# Patient Record
Sex: FEMALE | Race: BLACK OR AFRICAN AMERICAN | Employment: OTHER | URBAN - METROPOLITAN AREA
[De-identification: names, ages, dates, MRNs, and addresses within clinical notes are randomized per-mention and may not be internally consistent; named-entity substitution may affect disease eponyms.]

---

## 2017-01-11 ENCOUNTER — OFFICE VISIT (OUTPATIENT)
Dept: ONCOLOGY | Age: 77
End: 2017-01-11

## 2017-01-11 ENCOUNTER — HOSPITAL ENCOUNTER (OUTPATIENT)
Dept: LAB | Age: 77
Discharge: HOME | End: 2017-01-11
Payer: MEDICARE

## 2017-01-11 ENCOUNTER — HOSPITAL ENCOUNTER (OUTPATIENT)
Dept: ONCOLOGY | Age: 77
Discharge: HOME | End: 2017-01-11

## 2017-01-11 VITALS
SYSTOLIC BLOOD PRESSURE: 126 MMHG | DIASTOLIC BLOOD PRESSURE: 80 MMHG | HEART RATE: 75 BPM | HEIGHT: 63 IN | WEIGHT: 123 LBS | TEMPERATURE: 97.9 F | BODY MASS INDEX: 21.79 KG/M2

## 2017-01-11 DIAGNOSIS — N18.3 ANEMIA ASSOCIATED WITH CHRONIC RENAL FAILURE, STAGE 3 (MODERATE): ICD-10-CM

## 2017-01-11 DIAGNOSIS — D72.819 LEUKOPENIA, UNSPECIFIED TYPE: ICD-10-CM

## 2017-01-11 DIAGNOSIS — D46.9 MYELODYSPLASTIC SYNDROME (HCC): ICD-10-CM

## 2017-01-11 DIAGNOSIS — D63.1 ANEMIA ASSOCIATED WITH CHRONIC RENAL FAILURE, STAGE 3 (MODERATE): ICD-10-CM

## 2017-01-11 DIAGNOSIS — D69.6 THROMBOCYTOPENIA (HCC): ICD-10-CM

## 2017-01-11 DIAGNOSIS — D46.9 MYELODYSPLASTIC SYNDROME (HCC): Primary | ICD-10-CM

## 2017-01-11 LAB
ALBUMIN SERPL BCP-MCNC: 3.9 G/DL (ref 3.4–5)
ALBUMIN/GLOB SERPL: 1.1 {RATIO} (ref 0.8–1.7)
ALP SERPL-CCNC: 91 U/L (ref 45–117)
ALT SERPL-CCNC: 11 U/L (ref 13–56)
ANION GAP BLD CALC-SCNC: 10 MMOL/L (ref 3–18)
AST SERPL W P-5'-P-CCNC: 11 U/L (ref 15–37)
BASO+EOS+MONOS # BLD AUTO: 0.2 K/UL (ref 0–2.3)
BASO+EOS+MONOS # BLD AUTO: 4 % (ref 0.1–17)
BILIRUB SERPL-MCNC: 0.3 MG/DL (ref 0.2–1)
BUN SERPL-MCNC: 33 MG/DL (ref 7–18)
BUN/CREAT SERPL: 19 (ref 12–20)
CALCIUM SERPL-MCNC: 9.6 MG/DL (ref 8.5–10.1)
CHLORIDE SERPL-SCNC: 103 MMOL/L (ref 100–108)
CO2 SERPL-SCNC: 25 MMOL/L (ref 21–32)
CREAT SERPL-MCNC: 1.7 MG/DL (ref 0.6–1.3)
DIFFERENTIAL METHOD BLD: ABNORMAL
ERYTHROCYTE [DISTWIDTH] IN BLOOD BY AUTOMATED COUNT: 13.2 % (ref 11.5–14.5)
FERRITIN SERPL-MCNC: 217 NG/ML (ref 8–388)
GLOBULIN SER CALC-MCNC: 3.5 G/DL (ref 2–4)
GLUCOSE SERPL-MCNC: 194 MG/DL (ref 74–99)
HCT VFR BLD AUTO: 30.3 % (ref 36–48)
HGB BLD-MCNC: 9.8 G/DL (ref 12–16)
IRON SATN MFR SERPL: 26 %
IRON SERPL-MCNC: 64 UG/DL (ref 50–175)
LYMPHOCYTES # BLD AUTO: 28 % (ref 14–44)
LYMPHOCYTES # BLD: 1.4 K/UL (ref 1.1–5.9)
MCH RBC QN AUTO: 30.2 PG (ref 25–35)
MCHC RBC AUTO-ENTMCNC: 32.3 G/DL (ref 31–37)
MCV RBC AUTO: 93.5 FL (ref 78–102)
NEUTS SEG # BLD: 3.5 K/UL (ref 1.8–9.5)
NEUTS SEG NFR BLD AUTO: 68 % (ref 40–70)
PLATELET # BLD AUTO: 217 K/UL (ref 140–440)
POTASSIUM SERPL-SCNC: 5 MMOL/L (ref 3.5–5.5)
PROT SERPL-MCNC: 7.4 G/DL (ref 6.4–8.2)
RBC # BLD AUTO: 3.24 M/UL (ref 4.1–5.1)
SODIUM SERPL-SCNC: 138 MMOL/L (ref 136–145)
TIBC SERPL-MCNC: 245 UG/DL (ref 250–450)
WBC # BLD AUTO: 5.1 K/UL (ref 4.5–13)

## 2017-01-11 PROCEDURE — 82728 ASSAY OF FERRITIN: CPT | Performed by: NURSE PRACTITIONER

## 2017-01-11 PROCEDURE — 36415 COLL VENOUS BLD VENIPUNCTURE: CPT | Performed by: NURSE PRACTITIONER

## 2017-01-11 PROCEDURE — 80053 COMPREHEN METABOLIC PANEL: CPT | Performed by: NURSE PRACTITIONER

## 2017-01-11 PROCEDURE — 83540 ASSAY OF IRON: CPT | Performed by: NURSE PRACTITIONER

## 2017-01-11 NOTE — PROGRESS NOTES
Hematology/Oncology  Progress Note    Name: Aquilino Johnston  Date: 2017  : 1940    Clara Tavera MD     Ms. Saud Ly is a 77y.o. year old female who was seen for Anemia secondary to chronic kidney disease, myelodysplastic syndrome, and chronic leukopenia and thrombocytopenia. Current therapy: Procrit,  dose of 60,000 units whenever the hematocrit is below 30% and supportive care. Subjective:     Ms. Saud Ly is a 80-year-old woman who has a long-standing history of chronic kidney disease with associated anemia. She also has myelodysplastic syndrome. The patient has no complaint of weakness or fatigue. She also denies bleeding, blood loss or any bruising. She report poor appetite at times. She states she is doing well and feels fine at present. She is s/p right knee replacement and is currently participation in a physical therapy program.    Past medical history, family history, and social history: these were reviewed and remains unchanged. Past Medical History   Diagnosis Date    Arthritis     Asthma     Breathing problem     Diabetes ([address]     Hypercholesterolemia     Hypertension     Iron deficiency anemia     Pacemaker     Ulcer [facility])      Past Surgical History   Procedure Laterality Date    Endoscopy, colon, diagnostic      Hx orthopaedic       knee replacement    Hx hernia repair      Pr cardiac surg procedure unlist       pacemaker     Social History     Social History    Marital status:      Spouse name: N/A    Number of children: N/A    Years of education: N/A     Occupational History    Not on file.      Social History Main Topics    Smoking status: Never Smoker    Smokeless tobacco: Not on file    Alcohol use No    Drug use: No    Sexual activity: Not on file     Other Topics Concern    Not on file     Social History Narrative     Family History   Problem Relation Age of Onset    Hypertension Other     Heart Disease Neg Hx     Cancer Neg Hx     Diabetes Neg Hx     Stroke Neg Hx      Current Outpatient Prescriptions   Medication Sig Dispense Refill    simvastatin (ZOCOR) 40 mg tablet Take  by mouth nightly.  apixaban (ELIQUIS) 2.5 mg tablet Take 2.5 mg by mouth two (2) times a day.  lisinopril (PRINIVIL, ZESTRIL) 40 mg tablet Take 40 mg by mouth daily.  NIFEdipine ER (PROCARDIA XL) 90 mg ER tablet Take 90 mg by mouth daily.  hydrochlorothiazide (HYDRODIURIL) 25 mg tablet Take 25 mg by mouth daily. Review of Systems  Constitutional: The patient has no acute distress or discomfort. HEENT: The patient denies recent head trauma, eye pain, blurred vision,  hearing deficit, oropharyngeal mucosal pain or lesions, and the patient denies throat pain or discomfort. Lymphatics: The patient denies palpable peripheral lymphadenopathy. Hematologic: The patient denies having bruising, bleeding, or progressive fatigue. Respiratory: Patient denies having shortness of breath, cough, sputum production, fever, or dyspnea on exertion. Cardiovascular: The patient denies having leg pain, leg swelling, heart palpitations, chest permit, chest pain, or lightheadedness. The patient denies having dyspnea on exertion. Gastrointestinal: The patient denies having nausea, emesis, or diarrhea. The patient denies having any hematemesis or blood in the stool. Genitourinary: Patient denies having urinary urgency, frequency, or dysuria. The patient denies having blood in the urine. Psychological: The patient denies having symptoms of nervousness, anxiety, depression, or thoughts of harming himself some of this. Skin: Patient denies having skin rashes, skin, ulcerations, or unexplained itching or pruritus. Musculoskeletal: The patient denies having pain in the joints or bones. S/p right knee replacement. .using a cane for mobility support.       Objective:     Visit Vitals    /80    Pulse 75    Temp 97.9 °F (36.6 °C)    Ht 5' 3\" (1.6 m)    [employer] 55.8 kg (123 lb)    BMI 21.79 kg/m2     ECOG 1 PS 0/10  Physical Exam:   Gen. Appearance: The patient is in no acute distress. Skin: There is no bruise or rash. HEENT: The exam is unremarkable. Neck: Supple without lymphadenopathy or thyromegaly. Lungs: Clear to auscultation and percussion; there are no wheezes or rhonchi. Heart: Regular rate and rhythm; there are no murmurs, gallops, or rubs. Abdomen: Bowel sounds are present and normal.  There is no guarding, tenderness, or hepatosplenomegaly. Extremities: There is no clubbing, cyanosis, or edema. Neurologic: There are no focal neurologic deficits. Lymphatics: There is no palpable peripheral lymphadenopathy. Musculoskeletal: The patient has full range of motion at all joints. There is no evidence of joint deformity or effusions. There is no focal joint tenderness. Psychological/psychiatric: There is no clinical evidence of anxiety, depression, or melancholy. Lab data:      Results for orders placed or performed during the hospital encounter of 01/11/17   CBC WITH 3 PART DIFF     Status: Abnormal   Result Value Ref Range Status    WBC 5.1 4.5 - 13.0 K/uL Final    RBC 3.24 (L) 4.10 - 5.10 M/uL Final    HGB 9.8 (L) 12.0 - 16.0 g/dL Final    HCT 30.3 (L) 36 - 48 % Final    MCV 93.5 78 - 102 FL Final    MCH 30.2 25.0 - 35.0 PG Final    MCHC 32.3 31 - 37 g/dL Final    RDW 13.2 11.5 - 14.5 % Final    PLATELET 312 594 - 867 K/uL Final    NEUTROPHILS 68 40 - 70 % Final    MIXED CELLS 4 0.1 - 17 % Final    LYMPHOCYTES 28 14 - 44 % Final    ABS. NEUTROPHILS 3.5 1.8 - 9.5 K/UL Final    ABS. MIXED CELLS 0.2 0.0 - 2.3 K/uL Final    ABS. LYMPHOCYTES 1.4 1.1 - 5.9 K/UL Final     Comment: Test performed at [place] [address] Location. Results Reviewed by Medical Director. DF AUTOMATED   Final           Assessment:     1. Myelodysplastic syndrome ([facility]    2. Anemia associated with chronic renal failure, stage 3 (moderate)    3.  Leukopenia, unspecified type    4. Thrombocytopenia ([facility]          Plan:   Myelodysplastic syndrome: I have explained to the patient that she has this chronic bone marrow disorder, which is contributing to her anemia, as well as her chronic kidney disease. The leukopenia is relatively stable at this time with a count of 5.1 and the platelet count is currently 217,000. This will continue to be monitored every 3 months for any evidence of progression. 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 I have explained to patient that her CBC today shows a WBC of 5.1, Hemoglobin of 9.8 g/dl and hematocrit of 30.3%. I have told patient that therapeutic intervention will be indicated when her hemoglobin declines below 10 g/dl and hematocrit is below 30%. She will receive 60,000 units of Procrit subcutaneous injection once every 2 weeks or once a month. However I told her to start taking ferrous sulfate once a day and I will see her for follow up in clinic in 3 months. Mild Leukopenia: I have explained to patient that her WBC is normal today at 5.1. I will monitor this however if her WBC declines to 1.0 and absolute neutrophil also declines to 0.5,  she will need intervention in the form of Neupogen or Neulasta. No intervention is warranted at this time. Thrombocytopenia (resolved Problem): I have explained to patient that her Platelet today is normal at 217,000. She denies bleeding or bruising. Previous bone marrow biopsy did not show any evidence of lymphropliferative disorder. I will continue to monitor the Platelet counts on a regular basis. Therapeutic intervention would be recommended if the platelet count should decline below 30,000. I will order her Ferritin, iron profile and compehrensive metabolic panel today and will see patient back for reassessment in 3 months.       Orders Placed This Encounter    COMPLETE CBC & AUTO DIFF WBC    InHouse CBC ([facility])     Standing Status:   Future     Number of Occurrences:   1 Standing Expiration Date:   5/05/9231    METABOLIC PANEL, COMPREHENSIVE     Standing Status:   Future     Standing Expiration Date:   1/12/2018    IRON PROFILE     Standing Status:   Future     Standing Expiration Date:   1/12/2018    FERRITIN     Standing Status:   Future     Standing Expiration Date:   1/12/2018       Bethany Ryder NP  1/11/2017

## 2017-01-11 NOTE — MR AVS SNAPSHOT
Visit Information Date & Time Provider Department Dept. Phone Encounter #  
 1/11/2017  9:45 AM [facility] [phone] [id number] Follow-up Instructions Return in about 3 months (around 4/11/2017). Your Appointments 4/12/2017 10:15 AM  
Office Visit with Lexi Velásquez MD  
[address]  Oncology Chelsey Evangelista) Appt Note: 3 mo fu  
 [facility] [address], [place] [address] [facility] [address] [facility] [address], [place] [address] 200 [facility] Upcoming Health Maintenance Date Due DTaP/Tdap/Td series (1 - Tdap) 7/16/1962 ZOSTER VACCINE AGE 60> 7/16/2001 GLAUCOMA SCREENING Q2Y 7/16/2006 OSTEOPOROSIS SCREENING (DEXA) 7/16/2006 Pneumococcal 65+ High/Highest Risk (1 of 2 - PCV13) 7/16/2006 MEDICARE YEARLY EXAM 7/16/2006 INFLUENZA AGE 9 TO ADULT 8/1/2016 Allergies as of 1/11/2017  Review Complete On: 1/11/2017 By: Silvia Bowling NP Severity Noted Reaction Type Reactions Morphine    Unable to Obtain Neurontin [Gabapentin]  03/17/2015    Unknown (comments) Pcn [Penicillins]    Unable to Obtain Current Immunizations  Never Reviewed No immunizations on file. Not reviewed this visit You Were Diagnosed With   
  
 Codes Comments Myelodysplastic syndrome ([facility]    -  Primary ICD-10-CM: D46.9 ICD-9-CM: 238.75 Anemia associated with chronic renal failure, stage 3 (moderate)     ICD-10-CM: N18.3, D63.1 ICD-9-CM: 285.21, 585.3 Leukopenia, unspecified type     ICD-10-CM: D72.819 ICD-9-CM: 288.50 Thrombocytopenia ([facility]     ICD-10-CM: D69.6 ICD-9-CM: 287.5 Vitals BP Pulse Temp Height(growth percentile) Weight(growth percentile) BMI  
 126/80 75 97.9 °F (36.6 °C) 5' 3\" (1.6 m) 123 lb (55.8 kg) 21.79 kg/m2 Smoking Status Never Smoker BMI and BSA Data Body Mass Index Body Surface Area 21.79 kg/m 2 1.57 m 2 Your Updated Medication List  
  
   
This list is accurate as of: 1/11/17 11:09 AM.  Always use your most recent med list.  
  
  
  
  
 ELIQUIS 2.5 mg tablet Generic drug:  apixaban Take 2.5 mg by mouth two (2) times a day. hydroCHLOROthiazide 25 mg tablet Commonly known as:  HYDRODIURIL Take 25 mg by mouth daily. lisinopril 40 mg tablet Commonly known as:  Ora Bee Take 40 mg by mouth daily. NIFEdipine ER 90 mg ER tablet Commonly known as:  PROCARDIA XL Take 90 mg by mouth daily. ZOCOR 40 mg tablet Generic drug:  simvastatin Take  by mouth nightly. We Performed the Following COMPLETE CBC & AUTO DIFF WBC [19719 CPT(R)] Follow-up Instructions Return in about 3 months (around 4/11/2017). To-Do List   
 01/11/2017 Lab:  CBC WITH 3 PART DIFF   
  
 01/12/2017 Lab:  FERRITIN   
  
 01/12/2017 Lab:  IRON PROFILE   
  
 01/12/2017 Lab:  METABOLIC PANEL, COMPREHENSIVE Introducing [facility] & HEALTH SERVICES! [place] Ewing introduces [employer] patient portal. Now you can access parts of your medical record, email your doctor's office, and request medication refills online. 1. In your internet browser, go to [email or web address] 2. Click on the First Time User? Click Here link in the Sign In box. You will see the New Member Sign Up page. 3. Enter your [employer] Access Code exactly as it appears below. You will not need to use this code after youve completed the sign-up process. If you do not sign up before the expiration date, you must request a new code. · [employer] Access Code: [email or web address] Expires: 4/11/2017  9:51 AM 
 
4. Enter the last four digits of your Social Security Number (xxxx) and Date of Birth (mm/dd/yyyy) as indicated and click Submit. You will be taken to the next sign-up page. 5. Create a Financial Fair[employer] ID.  This will be your [employer] login ID and cannot be changed, so think of one that is secure and easy to remember. 6. Create a Vastari password. You can change your password at any time. 7. Enter your Password Reset Question and Answer. This can be used at a later time if you forget your password. 8. Enter your e-mail address. You will receive e-mail notification when new information is available in [address] 9. Click Sign Up. You can now view and download portions of your medical record. 10. Click the Download Summary menu link to download a portable copy of your medical information. If you have questions, please visit the Frequently Asked Questions section of the Vastari website. Remember, Vastari is NOT to be used for urgent needs. For medical emergencies, dial 911. Now available from your iPhone and Android! Please provide this summary of care documentation to your next provider. Your primary care clinician is listed as ANTHONY WEST. If you have any questions after today's visit, please call [phone].

## 2017-04-12 ENCOUNTER — OFFICE VISIT (OUTPATIENT)
Dept: ONCOLOGY | Age: 77
End: 2017-04-12

## 2017-04-12 ENCOUNTER — HOSPITAL ENCOUNTER (OUTPATIENT)
Dept: LAB | Age: 77
Discharge: HOME | End: 2017-04-12
Payer: MEDICARE

## 2017-04-12 ENCOUNTER — HOSPITAL ENCOUNTER (OUTPATIENT)
Dept: ONCOLOGY | Age: 77
Discharge: HOME | End: 2017-04-12

## 2017-04-12 VITALS
SYSTOLIC BLOOD PRESSURE: 158 MMHG | DIASTOLIC BLOOD PRESSURE: 83 MMHG | WEIGHT: 131 LBS | HEART RATE: 63 BPM | BODY MASS INDEX: 23.21 KG/M2 | TEMPERATURE: 97.1 F

## 2017-04-12 DIAGNOSIS — D72.819 LEUKOPENIA, UNSPECIFIED TYPE: ICD-10-CM

## 2017-04-12 DIAGNOSIS — D63.1 ANEMIA ASSOCIATED WITH CHRONIC RENAL FAILURE: ICD-10-CM

## 2017-04-12 DIAGNOSIS — N18.9 ANEMIA ASSOCIATED WITH CHRONIC RENAL FAILURE: ICD-10-CM

## 2017-04-12 DIAGNOSIS — D46.9 MYELODYSPLASTIC SYNDROME (HCC): Primary | ICD-10-CM

## 2017-04-12 DIAGNOSIS — D46.9 MYELODYSPLASTIC SYNDROME (HCC): ICD-10-CM

## 2017-04-12 DIAGNOSIS — D69.6 THROMBOCYTOPENIA (HCC): ICD-10-CM

## 2017-04-12 LAB
ALBUMIN SERPL BCP-MCNC: 3.9 G/DL (ref 3.4–5)
ALBUMIN/GLOB SERPL: 1.1 {RATIO} (ref 0.8–1.7)
ALP SERPL-CCNC: 84 U/L (ref 45–117)
ALT SERPL-CCNC: 23 U/L (ref 13–56)
ANION GAP BLD CALC-SCNC: 10 MMOL/L (ref 3–18)
AST SERPL W P-5'-P-CCNC: 29 U/L (ref 15–37)
BASO+EOS+MONOS # BLD AUTO: 0.1 K/UL (ref 0–2.3)
BASO+EOS+MONOS # BLD AUTO: 3 % (ref 0.1–17)
BILIRUB SERPL-MCNC: 0.5 MG/DL (ref 0.2–1)
BUN SERPL-MCNC: 36 MG/DL (ref 7–18)
BUN/CREAT SERPL: 21 (ref 12–20)
CALCIUM SERPL-MCNC: 8.8 MG/DL (ref 8.5–10.1)
CHLORIDE SERPL-SCNC: 108 MMOL/L (ref 100–108)
CO2 SERPL-SCNC: 23 MMOL/L (ref 21–32)
CREAT SERPL-MCNC: 1.74 MG/DL (ref 0.6–1.3)
DIFFERENTIAL METHOD BLD: ABNORMAL
ERYTHROCYTE [DISTWIDTH] IN BLOOD BY AUTOMATED COUNT: 12.8 % (ref 11.5–14.5)
FERRITIN SERPL-MCNC: 107 NG/ML (ref 8–388)
GLOBULIN SER CALC-MCNC: 3.6 G/DL (ref 2–4)
GLUCOSE SERPL-MCNC: 100 MG/DL (ref 74–99)
HCT VFR BLD AUTO: 32.6 % (ref 36–48)
HGB BLD-MCNC: 10.9 G/DL (ref 12–16)
IRON SATN MFR SERPL: 23 %
IRON SERPL-MCNC: 68 UG/DL (ref 50–175)
LYMPHOCYTES # BLD AUTO: 31 % (ref 14–44)
LYMPHOCYTES # BLD: 1.2 K/UL (ref 1.1–5.9)
MCH RBC QN AUTO: 30.4 PG (ref 25–35)
MCHC RBC AUTO-ENTMCNC: 33.4 G/DL (ref 31–37)
MCV RBC AUTO: 90.8 FL (ref 78–102)
NEUTS SEG # BLD: 2.7 K/UL (ref 1.8–9.5)
NEUTS SEG NFR BLD AUTO: 66 % (ref 40–70)
PLATELET # BLD AUTO: 144 K/UL (ref 140–440)
POTASSIUM SERPL-SCNC: 5.1 MMOL/L (ref 3.5–5.5)
PROT SERPL-MCNC: 7.5 G/DL (ref 6.4–8.2)
RBC # BLD AUTO: 3.59 M/UL (ref 4.1–5.1)
SODIUM SERPL-SCNC: 141 MMOL/L (ref 136–145)
TIBC SERPL-MCNC: 290 UG/DL (ref 250–450)
WBC # BLD AUTO: 4 K/UL (ref 4.5–13)

## 2017-04-12 PROCEDURE — 80053 COMPREHEN METABOLIC PANEL: CPT | Performed by: NURSE PRACTITIONER

## 2017-04-12 PROCEDURE — 82728 ASSAY OF FERRITIN: CPT | Performed by: NURSE PRACTITIONER

## 2017-04-12 PROCEDURE — 83540 ASSAY OF IRON: CPT | Performed by: NURSE PRACTITIONER

## 2017-04-12 PROCEDURE — 36415 COLL VENOUS BLD VENIPUNCTURE: CPT | Performed by: NURSE PRACTITIONER

## 2017-04-12 NOTE — PATIENT INSTRUCTIONS
Anemia From Chronic Disease: Care Instructions  Your Care Instructions    Anemia is a low level of red blood cells, which carry oxygen from your lungs to the rest of your body. Sometimes when you have a long-term (chronic) disease such as kidney disease, arthritis, diabetes, cancer, or an infection, your body does not make enough red blood cells. Follow-up care is a key part of your treatment and safety. Be sure to make and go to all appointments, and call your doctor if you are having problems. It's also a good idea to know your test results and keep a list of the medicines you take. How can you care for yourself at home? · Follow your doctor's instructions to treat the chronic condition that is causing the anemia. · Take your medicine to treat your chronic condition exactly as prescribed. Call your doctor if you think you are having a problem with your medicine. · Take your medicine for anemia exactly as prescribed. Call your doctor if you think you are having a problem with your medicine. Medicines to increase the number of red blood cells (such as epoetin or darbepoetin) may be given as an injection. ¨ If you miss a dose, take it as soon as you can, unless it is almost time for your next dose. In that case, get back on your regular schedule and take only one dose. ¨ Do not freeze this medicine. Store it in the refrigerator. Do not shake the bottle before you prepare the shot. · Keep all your appointments for blood tests to check on your hemoglobin levels. When should you call for help? Call 911 anytime you think you may need emergency care. For example, call if:  · You passed out (lost consciousness). · You have symptoms of a heart attack, such as:  ¨ Chest pain or pressure. ¨ Sweating. ¨ Shortness of breath. ¨ Nausea or vomiting. ¨ Pain that spreads from the chest to the neck, jaw, or one or both shoulders or arms. ¨ Dizziness or lightheadedness. ¨ A fast or uneven pulse.   After calling 911, chew 1 adult-strength aspirin. Wait for an ambulance. Do not try to drive yourself. · You have a seizure. Call your doctor now or seek immediate medical care if:  · You have side effects of epoetin and darbepoetin, such as:  ¨ A headache. ¨ A fever. ¨ Diarrhea, nausea, or vomiting. ¨ Fatigue. ¨ Muscle or joint pain. ¨ A skin rash. · Your fatigue and weakness continue or get worse. Watch closely for changes in your health, and be sure to contact your doctor if you have any problems. Where can you learn more? Go to http://nichol-john.info/. Enter E502 in the search box to learn more about \"Anemia From Chronic Disease: Care Instructions. \"  Current as of: October 13, 2016  Content Version: 11.2  © 5315-8305 [employer]. Care instructions adapted under license by [employer] (which disclaims liability or warranty for this information). If you have questions about a medical condition or this instruction, always ask your healthcare professional. [place] [address] any warranty or liability for your use of this information.

## 2017-04-12 NOTE — MR AVS SNAPSHOT
Visit Information Date & Time Provider Department Dept. Phone Encounter #  
 4/12/2017 10:15 AM Jose Gutierrez MD [facility] Medical Oncology [phone] [id number] Follow-up Instructions Return in about 3 months (around 7/12/2017). Your Appointments 7/12/2017 10:45 AM  
Office Visit with Jose Gutierrez MD  
[address]  Oncology Erby Jaylyn) Appt Note: 3 month follow up  
 [place] [address], [place] [address] [place] [address] [facility] [address] [facility], [address] [place] [address] Upcoming Health Maintenance Date Due DTaP/Tdap/Td series (1 - Tdap) 7/16/1962 ZOSTER VACCINE AGE 60> 7/16/2001 GLAUCOMA SCREENING Q2Y 7/16/2006 OSTEOPOROSIS SCREENING (DEXA) 7/16/2006 Pneumococcal 65+ High/Highest Risk (1 of 2 - PCV13) 7/16/2006 MEDICARE YEARLY EXAM 7/16/2006 INFLUENZA AGE 9 TO ADULT 8/1/2016 Allergies as of 4/12/2017  Review Complete On: 4/12/2017 By: Reynaldo Tariq NP Severity Noted Reaction Type Reactions Morphine    Unable to Obtain Neurontin [Gabapentin]  03/17/2015    Unknown (comments) Pcn [Penicillins]    Unable to Obtain Current Immunizations  Never Reviewed No immunizations on file. Not reviewed this visit You Were Diagnosed With   
  
 Codes Comments [facility]odysplastic syndrome ([facility]    -  Primary ICD-10-CM: D46.9 ICD-9-CM: 238.75 Leukopenia, unspecified type     ICD-10-CM: D72.819 ICD-9-CM: 288.50 Thrombocytopenia ([facility]     ICD-10-CM: D69.6 ICD-9-CM: 287.5 Anemia associated with chronic renal failure     ICD-10-CM: D63.1 ICD-9-CM: 285.21 Vitals BP Pulse Temp Weight(growth percentile) BMI Smoking Status 158/83 63 97.1 °F (36.2 °C) 131 lb (59.4 kg) 23.21 kg/m2 Never Smoker Vitals History BMI and BSA Data  Body Mass Index Body Surface Area  
 23.21 kg/m 2 1.62 m 2  
  
  
 Your Updated Medication List  
  
   
This list is accurate as of: 4/12/17 12:11 PM.  Always use your most recent med list.  
  
  
  
  
 ELIQUIS 2.5 mg tablet Generic drug:  apixaban Take 2.5 mg by mouth two (2) times a day. hydroCHLOROthiazide 25 mg tablet Commonly known as:  HYDRODIURIL Take 25 mg by mouth daily. lisinopril 40 mg tablet Commonly known as:  Roxianne Daughters Take 40 mg by mouth daily. NIFEdipine ER 90 mg ER tablet Commonly known as:  PROCARDIA XL Take 90 mg by mouth daily. ZOCOR 40 mg tablet Generic drug:  simvastatin Take  by mouth nightly. We Performed the Following COMPLETE CBC & AUTO DIFF WBC [28556 CPT(R)] Follow-up Instructions Return in about 3 months (around 7/12/2017). To-Do List   
 04/12/2017 Lab:  CBC WITH 3 PART DIFF Patient Instructions Anemia From Chronic Disease: Care Instructions Your Care Instructions Anemia is a low level of red blood cells, which carry oxygen from your lungs to the rest of your body. Sometimes when you have a long-term (chronic) disease such as kidney disease, arthritis, diabetes, cancer, or an infection, your body does not make enough red blood cells. Follow-up care is a key part of your treatment and safety. Be sure to make and go to all appointments, and call your doctor if you are having problems. It's also a good idea to know your test results and keep a list of the medicines you take. How can you care for yourself at home? · Follow your doctor's instructions to treat the chronic condition that is causing the anemia. · Take your medicine to treat your chronic condition exactly as prescribed. Call your doctor if you think you are having a problem with your medicine. · Take your medicine for anemia exactly as prescribed. Call your doctor if you think you are having a problem with your medicine.  Medicines to increase the number of red blood cells (such as epoetin or darbepoetin) may be given as an injection. ¨ If you miss a dose, take it as soon as you can, unless it is almost time for your next dose. In that case, get back on your regular schedule and take only one dose. ¨ Do not freeze this medicine. Store it in the refrigerator. Do not shake the bottle before you prepare the shot. · Keep all your appointments for blood tests to check on your hemoglobin levels. When should you call for help? Call 911 anytime you think you may need emergency care. For example, call if: 
· You passed out (lost consciousness). · You have symptoms of a heart attack, such as: ¨ Chest pain or pressure. ¨ Sweating. ¨ Shortness of breath. ¨ Nausea or vomiting. ¨ Pain that spreads from the chest to the neck, jaw, or one or both shoulders or arms. ¨ Dizziness or lightheadedness. ¨ A fast or uneven pulse. After calling 911, chew 1 adult-strength aspirin. Wait for an ambulance. Do not try to drive yourself. · You have a seizure. Call your doctor now or seek immediate medical care if: 
· You have side effects of epoetin and darbepoetin, such as: ¨ A headache. ¨ A fever. ¨ Diarrhea, nausea, or vomiting. ¨ Fatigue. ¨ Muscle or joint pain. ¨ A skin rash. · Your fatigue and weakness continue or get worse. Watch closely for changes in your health, and be sure to contact your doctor if you have any problems. Where can you learn more? Go to http://nichol-john.info/. Enter E502 in the search box to learn more about \"Anemia From Chronic Disease: Care Instructions. \" Current as of: October 13, 2016 Content Version: 11.2 © 1182-0902 [employer]. Care instructions adapted under license by [employer]mine (which disclaims liability or warranty for this information).  If you have questions about a medical condition or this instruction, always ask your healthcare professional. [employer] disclaims any warranty or liability for your use of this information. Introducing [facility] & HEALTH SERVICES! Albayeimy Green introduces N-Trig patient portal. Now you can access parts of your medical record, email your doctor's office, and request medication refills online. 1. In your internet browser, go to [email or web address] 2. Click on the First Time User? Click Here link in the Sign In box. You will see the New Member Sign Up page. 3. Enter your N-Trig Access Code exactly as it appears below. You will not need to use this code after youve completed the sign-up process. If you do not sign up before the expiration date, you must request a new code. · N-Trig Access Code: [email or web address] Expires: 7/11/2017 10:38 AM 
 
4. Enter the last four digits of your Social Security Number (xxxx) and Date of Birth (mm/dd/yyyy) as indicated and click Submit. You will be taken to the next sign-up page. 5. Create a N-Trig ID. This will be your N-Trig login ID and cannot be changed, so think of one that is secure and easy to remember. 6. Create a N-Trig password. You can change your password at any time. 7. Enter your Password Reset Question and Answer. This can be used at a later time if you forget your password. 8. Enter your e-mail address. You will receive e-mail notification when new information is available in [address] 9. Click Sign Up. You can now view and download portions of your medical record. 10. Click the Download Summary menu link to download a portable copy of your medical information. If you have questions, please visit the Frequently Asked Questions section of the N-Trig website. Remember, N-Trig is NOT to be used for urgent needs. For medical emergencies, dial 911. Now available from your iPhone and Android! Please provide this summary of care documentation to your next provider. Your primary care clinician is listed as ANTHONY WEST. If you have any questions after today's visit, please call [phone].

## 2017-04-12 NOTE — PROGRESS NOTES
Hematology/Oncology  Progress Note    Name: Librado Gutiérrez  Date: 2017  : 1940    PCP:ANTHONY Porras MD     Ms. Kris Thompson is a 77 y.o.  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 Current therapy: Procrit,  dose of 60,000 units whenever the hematocrit is below 30% and supportive care. Subjective:     Ms. Kris Thompson is a 67-year-old woman who has a long-standing history of chronic kidney disease with associated anemia. She also has myelodysplastic syndrome. The patient has no complaint of weakness or fatigue. She also denies bleeding, blood loss or any bruising. She reports her appetite has improved greatly, she has also gained about 5 pounds. She states she is doing well and feels fine at present. She is s/p right knee replacement and is currently using a cane for mobility support. Past medical history, family history, and social history: these were reviewed and remains unchanged. Past Medical History:   Diagnosis Date    Arthritis     Asthma     Breathing problem     Diabetes ([address]     Hypercholesterolemia     Hypertension     Iron deficiency anemia     Pacemaker     Ulcer ([address]      Past Surgical History:   Procedure Laterality Date    CARDIAC SURG PROCEDURE UNLIST      pacemaker    ENDOSCOPY, COLON, DIAGNOSTIC      HX HERNIA REPAIR      HX ORTHOPAEDIC      knee replacement     Social History     Social History    Marital status:      Spouse name: N/A    Number of children: N/A    Years of education: N/A     Occupational History    Not on file.      Social History Main Topics    Smoking status: Never Smoker    Smokeless tobacco: Not on file    Alcohol use No    Drug use: No    Sexual activity: Not on file     Other Topics Concern    Not on file     Social History Narrative     Family History   Problem Relation Age of Onset    Hypertension Other     Heart Disease Neg Hx     Cancer Neg Hx     Diabetes Neg Hx     Stroke Neg Hx      Current Outpatient Prescriptions   Medication Sig Dispense Refill    simvastatin (ZOCOR) 40 mg tablet Take  by mouth nightly.  apixaban (ELIQUIS) 2.5 mg tablet Take 2.5 mg by mouth two (2) times a day.  lisinopril (PRINIVIL, ZESTRIL) 40 mg tablet Take 40 mg by mouth daily.  NIFEdipine ER (PROCARDIA XL) 90 mg ER tablet Take 90 mg by mouth daily.  hydrochlorothiazide (HYDRODIURIL) 25 mg tablet Take 25 mg by mouth daily. Review of Systems  Constitutional: The patient has no acute distress or discomfort. HEENT: The patient denies recent head trauma, eye pain, blurred vision,  hearing deficit, oropharyngeal mucosal pain or lesions, and the patient denies throat pain or discomfort. Lymphatics: The patient denies palpable peripheral lymphadenopathy. Hematologic: The patient denies having bruising, bleeding, or progressive fatigue. Respiratory: Patient denies having shortness of breath, cough, sputum production, fever, or dyspnea on exertion. Cardiovascular: The patient denies having leg pain, leg swelling, heart palpitations, chest permit, chest pain, or lightheadedness. The patient denies having dyspnea on exertion. Gastrointestinal: The patient denies having nausea, emesis, or diarrhea. The patient denies having any hematemesis or blood in the stool. Genitourinary: Patient denies having urinary urgency, frequency, or dysuria. The patient denies having blood in the urine. Psychological: The patient denies having symptoms of nervousness, anxiety, depression, or thoughts of harming himself some of this. Skin: Patient denies having skin rashes, skin, ulcerations, or unexplained itching or pruritus. Musculoskeletal: The patient denies having pain in the joints or bones. S/p right knee replacement. .using a cane for mobility support.       Objective:     Visit Vitals    /83    Pulse 63    Temp 97.1 °F (36.2 °C)  Wt 59.4 kg (131 lb)    BMI 23.21 kg/m2     ECOG 1 PS 0/10  Physical Exam:   Gen. Appearance: The patient is in no acute distress. Skin: There is no bruise or rash. HEENT: The exam is unremarkable. Neck: Supple without lymphadenopathy or thyromegaly. Lungs: Clear to auscultation and percussion; there are no wheezes or rhonchi. Heart: Regular rate and rhythm; there are no murmurs, gallops, or rubs. Abdomen: Bowel sounds are present and normal.  There is no guarding, tenderness, or hepatosplenomegaly. Extremities: There is no clubbing, cyanosis, or edema. Neurologic: There are no focal neurologic deficits. Lymphatics: There is no palpable peripheral lymphadenopathy. Musculoskeletal: The patient has full range of motion at all joints. There is no evidence of joint deformity or effusions. There is no focal joint tenderness. Psychological/psychiatric: There is no clinical evidence of anxiety, depression, or melancholy. Lab data:      Results for orders placed or performed during the hospital encounter of 04/12/17   CBC WITH 3 PART DIFF     Status: Abnormal   Result Value Ref Range Status    WBC 4.0 (L) 4.5 - 13.0 K/uL Final    RBC 3.59 (L) 4.10 - 5.10 M/uL Final    HGB 10.9 (L) 12.0 - 16 g/dL Final    HCT 32.6 (L) 36 - 48 % Final    MCV 90.8 78 - 102 FL Final    MCH 30.4 25.0 - 35.0 PG Final    MCHC 33.4 31 - 37 g/dL Final    RDW 12.8 11.5 - 14.5 % Final    PLATELET 853 346 - 858 K/uL Final    NEUTROPHILS 66 40 - 70 % Final    MIXED CELLS 3 0.1 - 17 % Final    LYMPHOCYTES 31 14 - 44 % Final    ABS. NEUTROPHILS 2.7 1.8 - 9.5 K/UL Final    ABS. MIXED CELLS 0.1 0.0 - 2.3 K/uL Final    ABS. LYMPHOCYTES 1.2 1.1 - 5.9 K/UL Final     Comment: Test performed at [place] [address] Location. Results Reviewed by Medical Director. DF AUTOMATED   Final           Assessment:     1. Myelodysplastic syndrome (HCC)    2. Leukopenia, unspecified type    3. Thrombocytopenia ([address]    4.  Anemia associated with chronic renal failure          Plan:   Myelodysplastic syndrome: I have explained to the patient that she has this chronic bone marrow disorder, which is contributing to her anemia, as well as her chronic kidney disease. The leukopenia is relatively stable at this time with a count of 4.0 and the platelet count is currently 144,000. This will continue to be monitored every 3 months for any evidence of progression. 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 I have explained to patient that her CBC today shows a WBC of4.0, Hemoglobin of 10.9 g/dl and hematocrit of 32.6%. I have told patient that therapeutic intervention will be indicated when her hemoglobin declines below 10 g/dl and hematocrit is below 30%. She will receive 60,000 units of Procrit subcutaneous injection once every 2 weeks or once a month. At this time, she will continue  taking ferrous sulfate once a day and I will see her for follow up in clinic in 3 months. Mild Leukopenia: I have explained to patient that her WBC is stable today at 4.0. I will monitor this however if her WBC declines to 1.0 and absolute neutrophil also declines to 0.5,  she will need intervention in the form of Neupogen or Neulasta. No intervention is warranted at this time. Thrombocytopenia (resolved Problem): I have explained to patient that her Platelet today is normal at 144,000. She denies bleeding or bruising. Previous bone marrow biopsy did not show any evidence of lymphropliferative disorder. I will continue to monitor the Platelet counts on a regular basis. Therapeutic intervention would be recommended if the platelet count should decline below 30,000. I will order her Ferritin, iron profile and compehrensive metabolic panel today and will see patient back for reassessment in 3 months.       Orders Placed This Encounter    COMPLETE CBC & AUTO DIFF WBC    InHouse CBC ([employer])     Standing Status:   Future     Number of Occurrences:   1 Standing Expiration Date:   1/48/4075    METABOLIC PANEL, COMPREHENSIVE     Standing Status:   Future     Number of Occurrences:   1     Standing Expiration Date:   4/13/2018    IRON PROFILE     Standing Status:   Future     Number of Occurrences:   1     Standing Expiration Date:   4/13/2018    FERRITIN     Standing Status:   Future     Number of Occurrences:   1     Standing Expiration Date:   4/13/2018       Chantel Vaca MD  4/12/2017

## 2017-07-26 ENCOUNTER — HOSPITAL ENCOUNTER (OUTPATIENT)
Dept: LAB | Age: 77
Discharge: HOME | End: 2017-07-26
Payer: MEDICARE

## 2017-07-26 ENCOUNTER — OFFICE VISIT (OUTPATIENT)
Dept: ONCOLOGY | Age: 77
End: 2017-07-26

## 2017-07-26 ENCOUNTER — HOSPITAL ENCOUNTER (OUTPATIENT)
Dept: ONCOLOGY | Age: 77
Discharge: HOME | End: 2017-07-26

## 2017-07-26 VITALS
HEART RATE: 69 BPM | SYSTOLIC BLOOD PRESSURE: 162 MMHG | WEIGHT: 128.2 LBS | TEMPERATURE: 97.1 F | BODY MASS INDEX: 21.89 KG/M2 | HEIGHT: 64 IN | OXYGEN SATURATION: 99 % | DIASTOLIC BLOOD PRESSURE: 85 MMHG

## 2017-07-26 DIAGNOSIS — D46.9 MYELODYSPLASTIC SYNDROME (HCC): Primary | ICD-10-CM

## 2017-07-26 DIAGNOSIS — D69.6 THROMBOCYTOPENIA (HCC): ICD-10-CM

## 2017-07-26 DIAGNOSIS — D63.1 ANEMIA ASSOCIATED WITH CHRONIC RENAL FAILURE: ICD-10-CM

## 2017-07-26 DIAGNOSIS — D46.9 MYELODYSPLASTIC SYNDROME (HCC): ICD-10-CM

## 2017-07-26 DIAGNOSIS — D47.1 MYELOPROLIFERATIVE DISORDER (HCC): ICD-10-CM

## 2017-07-26 DIAGNOSIS — R63.0 POOR APPETITE: ICD-10-CM

## 2017-07-26 DIAGNOSIS — D72.819 CHRONIC LEUKOPENIA: ICD-10-CM

## 2017-07-26 DIAGNOSIS — N18.9 ANEMIA ASSOCIATED WITH CHRONIC RENAL FAILURE: ICD-10-CM

## 2017-07-26 LAB
ALBUMIN SERPL BCP-MCNC: 3.7 G/DL (ref 3.4–5)
ALBUMIN/GLOB SERPL: 1.1 {RATIO} (ref 0.8–1.7)
ALP SERPL-CCNC: 72 U/L (ref 45–117)
ALT SERPL-CCNC: 21 U/L (ref 13–56)
ANION GAP BLD CALC-SCNC: 6 MMOL/L (ref 3–18)
AST SERPL W P-5'-P-CCNC: 18 U/L (ref 15–37)
BASO+EOS+MONOS # BLD AUTO: 0.2 K/UL (ref 0–2.3)
BASO+EOS+MONOS # BLD AUTO: 5 % (ref 0.1–17)
BILIRUB SERPL-MCNC: 0.4 MG/DL (ref 0.2–1)
BUN SERPL-MCNC: 26 MG/DL (ref 7–18)
BUN/CREAT SERPL: 17 (ref 12–20)
CALCIUM SERPL-MCNC: 8.8 MG/DL (ref 8.5–10.1)
CHLORIDE SERPL-SCNC: 109 MMOL/L (ref 100–108)
CO2 SERPL-SCNC: 27 MMOL/L (ref 21–32)
CREAT SERPL-MCNC: 1.57 MG/DL (ref 0.6–1.3)
DIFFERENTIAL METHOD BLD: ABNORMAL
ERYTHROCYTE [DISTWIDTH] IN BLOOD BY AUTOMATED COUNT: 12.9 % (ref 11.5–14.5)
FERRITIN SERPL-MCNC: 105 NG/ML (ref 8–388)
GLOBULIN SER CALC-MCNC: 3.3 G/DL (ref 2–4)
GLUCOSE SERPL-MCNC: 108 MG/DL (ref 74–99)
HCT VFR BLD AUTO: 28.9 % (ref 36–48)
HGB BLD-MCNC: 9.7 G/DL (ref 12–16)
IRON SATN MFR SERPL: 30 %
IRON SERPL-MCNC: 73 UG/DL (ref 50–175)
LYMPHOCYTES # BLD AUTO: 33 % (ref 14–44)
LYMPHOCYTES # BLD: 1.1 K/UL (ref 1.1–5.9)
MCH RBC QN AUTO: 30.7 PG (ref 25–35)
MCHC RBC AUTO-ENTMCNC: 33.6 G/DL (ref 31–37)
MCV RBC AUTO: 91.5 FL (ref 78–102)
NEUTS SEG # BLD: 2 K/UL (ref 1.8–9.5)
NEUTS SEG NFR BLD AUTO: 63 % (ref 40–70)
PLATELET # BLD AUTO: 135 K/UL (ref 140–440)
POTASSIUM SERPL-SCNC: 4.7 MMOL/L (ref 3.5–5.5)
PROT SERPL-MCNC: 7 G/DL (ref 6.4–8.2)
RBC # BLD AUTO: 3.16 M/UL (ref 4.1–5.1)
SODIUM SERPL-SCNC: 142 MMOL/L (ref 136–145)
TIBC SERPL-MCNC: 244 UG/DL (ref 250–450)
WBC # BLD AUTO: 3.3 K/UL (ref 4.5–13)

## 2017-07-26 PROCEDURE — 83540 ASSAY OF IRON: CPT | Performed by: INTERNAL MEDICINE

## 2017-07-26 PROCEDURE — 36415 COLL VENOUS BLD VENIPUNCTURE: CPT | Performed by: INTERNAL MEDICINE

## 2017-07-26 PROCEDURE — 80053 COMPREHEN METABOLIC PANEL: CPT | Performed by: INTERNAL MEDICINE

## 2017-07-26 PROCEDURE — 82728 ASSAY OF FERRITIN: CPT | Performed by: INTERNAL MEDICINE

## 2017-07-26 NOTE — MR AVS SNAPSHOT
Visit Information Date & Time Provider Department Dept. Phone Encounter #  
 7/26/2017 10:00 AM Gene Powers MD [facility] [phone] [id number] Follow-up Instructions Return in about 3 months (around 10/26/2017). Your Appointments 11/1/2017 10:00 AM  
Office Visit with Gene Powers MD  
[address]  Oncology [facility]) Appt Note: [address], [facility] [address] [facility] [address] [facility], [address] [place] [address] Upcoming Health Maintenance Date Due DTaP/Tdap/Td series (1 - Tdap) 7/16/1962 ZOSTER VACCINE AGE 60> 5/16/2001 GLAUCOMA SCREENING Q2Y 7/16/2006 OSTEOPOROSIS SCREENING (DEXA) 7/16/2006 Pneumococcal 65+ High/Highest Risk (1 of 2 - PCV13) 7/16/2006 MEDICARE YEARLY EXAM 7/16/2006 INFLUENZA AGE 9 TO ADULT 8/1/2017 Allergies as of 7/26/2017  Review Complete On: 7/26/2017 By: Gene Powers MD  
  
 Severity Noted Reaction Type Reactions Morphine    Unable to Obtain Neurontin [Gabapentin]  03/17/2015    Unknown (comments) Pcn [Penicillins]    Unable to Obtain Current Immunizations  Never Reviewed No immunizations on file. Not reviewed this visit You Were Diagnosed With   
  
 Codes Comments Myelodysplastic syndrome ([facility]    -  Primary ICD-10-CM: D46.9 ICD-9-CM: 238.75 Myeloproliferative disorder ([facility]     ICD-10-CM: D47.1 ICD-9-CM: 238.79 Anemia associated with chronic renal failure     ICD-10-CM: D63.1 ICD-9-CM: 285.21 Poor appetite     ICD-10-CM: R63.0 ICD-9-CM: 381. 0 Thrombocytopenia ([facility]     ICD-10-CM: D69.6 ICD-9-CM: 287.5 Chronic leukopenia     ICD-10-CM: D72.819 ICD-9-CM: 288.50 Vitals BP Pulse Temp SpO2 Smoking Status  162/85 (BP 1 Location: Right arm, BP Patient Position: Sitting) 69 97.1 °F (36.2 °C) (Oral) 99% Never Smoker Your Updated Medication List  
  
   
This list is accurate as of: 7/26/17 11:22 AM.  Always use your most recent med list.  
  
  
  
  
 ELIQUIS 2.5 mg tablet Generic drug:  apixaban Take 2.5 mg by mouth two (2) times a day. hydroCHLOROthiazide 25 mg tablet Commonly known as:  HYDRODIURIL Take 25 mg by mouth daily. lisinopril 40 mg tablet Commonly known as:  Purdin [place] Take 40 mg by mouth daily. NIFEdipine ER 90 mg ER tablet Commonly known as:  PROCARDIA XL Take 90 mg by mouth daily. ZOCOR 40 mg tablet Generic drug:  simvastatin Take  by mouth nightly. We Performed the Following COMPLETE CBC & AUTO DIFF WBC [17757 CPT(R)] FERRITIN [00995 CPT(R)] IRON PROFILE [id number] CPT(R)] METABOLIC PANEL, COMPREHENSIVE [27452 CPT(R)] Follow-up Instructions Return in about 3 months (around 10/26/2017). To-Do List   
 07/26/2017 Lab:  CBC WITH 3 PART DIFF Patient Instructions Myelodysplastic Syndromes: Care Instructions Your Care Instructions Myelodysplastic syndromes, also called MDS, are a group of rare conditions in which the bone marrow does not make enough healthy blood cells. Normally, the bone marrow makes red blood cells, white blood cells, and platelets. These cells carry oxygen in the blood, help the body fight infections, and help the blood clot. With MDS, you may feel weak and tired, get infections often, and bruise easily, although symptoms tend to vary. MDS is a form of blood cancer. In some cases, MDS can turn into acute myeloid leukemia (AML), another type of cancer. Some people develop MDS after treatment for cancer or exposure to pesticides or other chemicals. But in most cases, the cause of MDS is not known. Your doctor will use the results of blood tests to guide your treatment. There are many types of MDS, with different treatment plans for each. If you have enough red blood cells and are feeling all right, you may not need active treatment, but you and your doctor will want to watch your condition carefully. If you start feeling lightheaded and have no energy, you may need a blood transfusion. Your doctor also may give you antibiotics to prevent or treat infection. Follow-up care is a key part of your treatment and safety. Be sure to make and go to all appointments, and call your doctor if you are having problems. It's also a good idea to know your test results and keep a list of the medicines you take. How can you care for yourself at home? · Take your medicines exactly as prescribed. Call your doctor if you think you are having a problem with your medicine. You will get more details on the specific medicines your doctor prescribes. · If your doctor prescribed antibiotics, take them as directed. Do not stop taking them just because you feel better. You need to take the full course of antibiotics. If you have side effects from antibiotics, tell your doctor. · Take steps to control your stress and workload. Learn relaxation techniques. ¨ Share your feelings. Stress and tension affect our emotions. By expressing your feelings to others, you may be able to understand and cope with them. ¨ Consider joining a support group. Talking about a problem with your spouse, a good friend, or other people with similar problems is a good way to reduce tension and stress. ¨ Express yourself with art. Try writing, crafts, dance, or art to relieve stress. ¨ Be kind to your body and mind. Getting enough sleep, eating a healthy diet, and taking time to do things you enjoy can contribute to an overall feeling of balance in your life and help reduce stress. ¨ Get help if you need it. Discuss your concerns with your doctor or counselor. · Do not smoke. Smoking can make blood problems worse. If you need help quitting, talk to your doctor about stop-smoking programs and medicines. These can increase your chances of quitting for good. · If you have not already done so, prepare a list of advance directives. Advance directives are instructions to your doctor and family members about what kind of care you want if you become unable to speak or express yourself. · Call the [employer] ([phone]) or visit its website at [employer] [address] for more information. When should you call for help? Call 911 anytime you think you may need emergency care. For example, call if: 
· You passed out (lost consciousness). · You vomit blood or what looks like coffee grounds. · You pass maroon or very bloody stools. Call your doctor now or seek immediate medical care if: 
· Your stools are black and tarlike or have streaks of blood. · You have any unusual bleeding, such as: ¨ Blood spots under the skin. ¨ A nosebleed that you cannot stop. ¨ Bleeding gums when you brush your teeth. ¨ Blood in your urine. ¨ Vaginal bleeding when you are not having your period, or heavy period bleeding. · Your fatigue and weakness continue or get worse. · You have signs of infection, such as: 
¨ Increased pain, swelling, warmth, or redness. ¨ Red streaks leading from a sore. ¨ Pus draining from a sore. ¨ A fever. Watch closely for changes in your health, and be sure to contact your doctor if you have any problems. Where can you learn more? Go to http://nichol-john.info/. Enter Yajaira Soni in the search box to learn more about \"Myelodysplastic Syndromes: Care Instructions. \" Current as of: October 24, 2016 Content Version: 11.3 © 8342-1181 [employer]. Care instructions adapted under license by [employer] (which disclaims liability or warranty for this information).  If you have questions about a medical condition or this instruction, always ask your healthcare professional. [place] [address] any warranty or liability for your use of this information. Introducing [facility] & HEALTH SERVICES! Naren Martinez introduces Trans[employer]ex patient portal. Now you can access parts of your medical record, email your doctor's office, and request medication refills online. 1. In your internet browser, go to [email or web address] 2. Click on the First Time User? Click Here link in the Sign In box. You will see the New Member Sign Up page. 3. Enter your Transifex Access Code exactly as it appears below. You will not need to use this code after youve completed the sign-up process. If you do not sign up before the expiration date, you must request a new code. · Transifex Access Code: [email or web address] Expires: 10/24/2017  9:54 AM 
 
4. Enter the last four digits of your Social Security Number (xxxx) and Date of Birth (mm/dd/yyyy) as indicated and click Submit. You will be taken to the next sign-up page. 5. Create a Transifex ID. This will be your Transifex login ID and cannot be changed, so think of one that is secure and easy to remember. 6. Create a Transifex password. You can change your password at any time. 7. Enter your Password Reset Question and Answer. This can be used at a later time if you forget your password. 8. Enter your e-mail address. You will receive e-mail notification when new information is available in [address] 9. Click Sign Up. You can now view and download portions of your medical record. 10. Click the Download Summary menu link to download a portable copy of your medical information. If you have questions, please visit the Frequently Asked Questions section of the Transifex website. Remember, Transifex is NOT to be used for urgent needs. For medical emergencies, dial 911. Now available from your iPhone and Android! Please provide this summary of care documentation to your next provider. Your primary care clinician is listed as ANTHONY WEST. If you have any questions after today's visit, please call [phone].

## 2017-07-26 NOTE — PATIENT INSTRUCTIONS
Myelodysplastic Syndromes: Care Instructions  Your Care Instructions  Myelodysplastic syndromes, also called MDS, are a group of rare conditions in which the bone marrow does not make enough healthy blood cells. Normally, the bone marrow makes red blood cells, white blood cells, and platelets. These cells carry oxygen in the blood, help the body fight infections, and help the blood clot. With MDS, you may feel weak and tired, get infections often, and bruise easily, although symptoms tend to vary. MDS is a form of blood cancer. In some cases, MDS can turn into acute myeloid leukemia (AML), another type of cancer. Some people develop MDS after treatment for cancer or exposure to pesticides or other chemicals. But in most cases, the cause of MDS is not known. Your doctor will use the results of blood tests to guide your treatment. There are many types of MDS, with different treatment plans for each. If you have enough red blood cells and are feeling all right, you may not need active treatment, but you and your doctor will want to watch your condition carefully. If you start feeling lightheaded and have no energy, you may need a blood transfusion. Your doctor also may give you antibiotics to prevent or treat infection. Follow-up care is a key part of your treatment and safety. Be sure to make and go to all appointments, and call your doctor if you are having problems. It's also a good idea to know your test results and keep a list of the medicines you take. How can you care for yourself at home? · Take your medicines exactly as prescribed. Call your doctor if you think you are having a problem with your medicine. You will get more details on the specific medicines your doctor prescribes. · If your doctor prescribed antibiotics, take them as directed. Do not stop taking them just because you feel better. You need to take the full course of antibiotics.  If you have side effects from antibiotics, tell your doctor. · Take steps to control your stress and workload. Learn relaxation techniques. ¨ Share your feelings. Stress and tension affect our emotions. By expressing your feelings to others, you may be able to understand and cope with them. ¨ Consider joining a support group. Talking about a problem with your spouse, a good friend, or other people with similar problems is a good way to reduce tension and stress. ¨ Express yourself with art. Try writing, crafts, dance, or art to relieve stress. ¨ Be kind to your body and mind. Getting enough sleep, eating a healthy diet, and taking time to do things you enjoy can contribute to an overall feeling of balance in your life and help reduce stress. ¨ Get help if you need it. Discuss your concerns with your doctor or counselor. · Do not smoke. Smoking can make blood problems worse. If you need help quitting, talk to your doctor about stop-smoking programs and medicines. These can increase your chances of quitting for good. · If you have not already done so, prepare a list of advance directives. Advance directives are instructions to your doctor and family members about what kind of care you want if you become unable to speak or express yourself. · Call the [employer] (5-203.573.9966) or visit its website at [address] for more information. When should you call for help? Call 911 anytime you think you may need emergency care. For example, call if:  · You passed out (lost consciousness). · You vomit blood or what looks like coffee grounds. · You pass maroon or very bloody stools. Call your doctor now or seek immediate medical care if:  · Your stools are black and tarlike or have streaks of blood. · You have any unusual bleeding, such as:  ¨ Blood spots under the skin. ¨ A nosebleed that you cannot stop. ¨ Bleeding gums when you brush your teeth. ¨ Blood in your urine.   ¨ Vaginal bleeding when you are not having your period, or heavy period bleeding. · Your fatigue and weakness continue or get worse. · You have signs of infection, such as:  ¨ Increased pain, swelling, warmth, or redness. ¨ Red streaks leading from a sore. ¨ Pus draining from a sore. ¨ A fever. Watch closely for changes in your health, and be sure to contact your doctor if you have any problems. Where can you learn more? Go to http://nichol-john.info/. Enter [place] in the search box to learn more about \"Myelodysplastic Syndromes: Care Instructions. \"  Current as of: October 24, 2016  Content Version: 11.3  © 5422-0697 [employer]. Care instructions adapted under license by [employer] (which disclaims liability or warranty for this information). If you have questions about a medical condition or this instruction, always ask your healthcare professional. [address] any warranty or liability for your use of this information.

## 2017-07-26 NOTE — PROGRESS NOTES
Hematology/Oncology  Progress Note    Name: Gemma Stover  Date: 2017  : 1940    PCP:ANTHONY iY MD     Ms. Kannan Woo is a 69 y.o.  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 Current therapy: Procrit,  dose of 60,000 units whenever the hematocrit is below 30% and supportive care. Subjective:     Ms. Kannan Woo is a 71-year-old woman who has a long-standing history of chronic kidney disease with associated anemia. She also has myelodysplastic syndrome. The patient has no complaint of weakness or fatigue. She also denies bleeding, blood loss or any bruising. She reports her appetite has improved greatly, she has also gained about 5 pounds. She states that she is doing well and feels fine at present. She is s/p right knee replacement and is continuing to use a cane for mobility support. Past medical history, family history, and social history: these were reviewed and remains unchanged. Past Medical History:   Diagnosis Date    Arthritis     Asthma     Breathing problem     Diabetes ([address]     Hypercholesterolemia     Hypertension     Iron deficiency anemia     Pacemaker     Ulcer ([address]      Past Surgical History:   Procedure Laterality Date    CARDIAC SURG PROCEDURE UNLIST      pacemaker    ENDOSCOPY, COLON, DIAGNOSTIC      HX HERNIA REPAIR      HX ORTHOPAEDIC      knee replacement     Social History     Social History    Marital status:      Spouse name: N/A    Number of children: N/A    Years of education: N/A     Occupational History    Not on file.      Social History Main Topics    Smoking status: Never Smoker    Smokeless tobacco: Not on file    Alcohol use No    Drug use: No    Sexual activity: Not on file     Other Topics Concern    Not on file     Social History Narrative     Family History   Problem Relation Age of Onset    Hypertension Other     Heart Disease Neg Hx     Cancer Neg Hx     Diabetes Neg Hx     Stroke Neg Hx      Current Outpatient Prescriptions   Medication Sig Dispense Refill    simvastatin (ZOCOR) 40 mg tablet Take  by mouth nightly.  apixaban (ELIQUIS) 2.5 mg tablet Take 2.5 mg by mouth two (2) times a day.  lisinopril (PRINIVIL, ZESTRIL) 40 mg tablet Take 40 mg by mouth daily.  NIFEdipine ER (PROCARDIA XL) 90 mg ER tablet Take 90 mg by mouth daily.  hydrochlorothiazide (HYDRODIURIL) 25 mg tablet Take 25 mg by mouth daily. Review of Systems  Constitutional: The patient has no acute distress or discomfort. HEENT: The patient denies recent head trauma, eye pain, blurred vision,  hearing deficit, oropharyngeal mucosal pain or lesions, and the patient denies throat pain or discomfort. Lymphatics: The patient denies palpable peripheral lymphadenopathy. Hematologic: The patient denies having bruising, bleeding, or progressive fatigue. Respiratory: Patient denies having shortness of breath, cough, sputum production, fever, or dyspnea on exertion. Cardiovascular: The patient denies having leg pain, leg swelling, heart palpitations, chest permit, chest pain, or lightheadedness. The patient denies having dyspnea on exertion. Gastrointestinal: The patient denies having nausea, emesis, or diarrhea. The patient denies having any hematemesis or blood in the stool. Genitourinary: Patient denies having urinary urgency, frequency, or dysuria. The patient denies having blood in the urine. Psychological: The patient denies having symptoms of nervousness, anxiety, depression, or thoughts of harming himself some of this. Skin: Patient denies having skin rashes, skin, ulcerations, or unexplained itching or pruritus. Musculoskeletal: The patient denies having pain in the joints or bones. S/p right knee replacement. .using a cane for mobility support.       Objective:     Visit Vitals    /85 (BP 1 Location: Right arm, BP Patient Position: Sitting)    Pulse 69    Temp 97.1 °F (36.2 °C) (Oral)    SpO2 99%     ECOG 1 PS 0/10  Physical Exam:   Gen. Appearance: The patient is in no acute distress. Skin: There is no bruise or rash. HEENT: The exam is unremarkable. Neck: Supple without lymphadenopathy or thyromegaly. Lungs: Clear to auscultation and percussion; there are no wheezes or rhonchi. Heart: Regular rate and rhythm; there are no murmurs, gallops, or rubs. Abdomen: Bowel sounds are present and normal.  There is no guarding, tenderness, or hepatosplenomegaly. Extremities: There is no clubbing, cyanosis, or edema. Neurologic: There are no focal neurologic deficits. Lymphatics: There is no palpable peripheral lymphadenopathy. Musculoskeletal: The patient has full range of motion at all joints. There is no evidence of joint deformity or effusions. There is no focal joint tenderness. Psychological/psychiatric: There is no clinical evidence of anxiety, depression, or melancholy. Lab data:      Results for orders placed or performed during the hospital encounter of 07/26/17   CBC WITH 3 PART DIFF     Status: Abnormal   Result Value Ref Range Status    WBC 3.3 (L) 4.5 - 13.0 K/uL Final    RBC 3.16 (L) 4.10 - 5.10 M/uL Final    HGB 9.7 (L) 12.0 - 16 g/dL Final    HCT 28.9 (L) 36 - 48 % Final    MCV 91.5 78 - 102 FL Final    MCH 30.7 25.0 - 35.0 PG Final    MCHC 33.6 31 - 37 g/dL Final    RDW 12.9 11.5 - 14.5 % Final    PLATELET 234 (L) 247 - 440 K/uL Final    NEUTROPHILS 63 40 - 70 % Final    MIXED CELLS 5 0.1 - 17 % Final    LYMPHOCYTES 33 14 - 44 % Final    ABS. NEUTROPHILS 2.0 1.8 - 9.5 K/UL Final    ABS. MIXED CELLS 0.2 0.0 - 2.3 K/uL Final    ABS. LYMPHOCYTES 1.1 1.1 - 5.9 K/UL Final     Comment: Test performed at [place] [address] Location. Results Reviewed by Medical Director. DF AUTOMATED   Final           Assessment:     1. [facility] ([facility]    2. Myeloproliferative disorder ([facility]    3. Anemia associated with chronic renal failure    4. Poor appetite    5. Thrombocytopenia ([address]    6. Chronic leukopenia          Plan:   Myelodysplastic syndrome: I have explained to the patient that she has this chronic bone marrow disorder, which is contributing to her anemia, as well as her chronic kidney disease. The leukopenia is relatively stable at this time with a count of 3.3 and the platelet count is currently 135,000. This will continue to be monitored every 3 months for any evidence of progression. 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 I have explained to patient that her CBC today shows a WBC of 3.3, hemoglobin is 9.7 g/dL and hematocrit is 28.9%. I have told patient that therapeutic intervention will be indicated when her hemoglobin declines below 10 g/dl and hematocrit is below 30%. She will receive 60,000 units of Procrit subcutaneous injection once every 2 weeks or once a month. At this time, she will continue  taking ferrous sulfate once a day. Since her hemoglobin and hematocrit are now below 10 and 30 respectively we will schedule her to begin the Procrit as outlined. I will see her for follow up in clinic in 3 months. Chronic leukopenia: I have explained to patient that her WBC is stable today at 3.3. I will monitor this however if her WBC declines to 1.0 and absolute neutrophil also declines to 0.5,  she will need intervention in the form of Neupogen or Neulasta. No intervention is warranted at this time. Thrombocytopenia (resolved Problem): I have explained to patient that her Platelet today is normal at 303,000. She denies bleeding or bruising. Previous bone marrow biopsy did not show any evidence of lymphropliferative disorder. I will continue to monitor the Platelet counts on a regular basis. Therapeutic intervention would be recommended if the platelet count should decline below 30,000.         I will order her Ferritin, iron profile and compehrensive metabolic panel today and will see patient back for reassessment in 3 months.       Orders Placed This Encounter    COMPLETE CBC & AUTO DIFF WBC    InHouse CBC ([facility])     Standing Status:   Future     Number of Occurrences:   1     Standing Expiration Date:   3/3/1418    METABOLIC PANEL, COMPREHENSIVE     Standing Status:   Future     Standing Expiration Date:   7/27/2018    IRON PROFILE     Standing Status:   Future     Standing Expiration Date:   7/27/2018    FERRITIN     Standing Status:   Future     Standing Expiration Date:   7/27/2018       Shereen Mcdermott MD  7/26/2017

## 2017-10-09 ENCOUNTER — CLINICAL SUPPORT (OUTPATIENT)
Dept: ONCOLOGY | Age: 77
End: 2017-10-09

## 2017-10-09 ENCOUNTER — HOSPITAL ENCOUNTER (OUTPATIENT)
Dept: ONCOLOGY | Age: 77
Discharge: HOME | End: 2017-10-09

## 2017-10-09 DIAGNOSIS — D46.9 MYELODYSPLASTIC SYNDROME (HCC): Primary | ICD-10-CM

## 2017-10-09 DIAGNOSIS — D46.9 MYELODYSPLASTIC SYNDROME (HCC): ICD-10-CM

## 2017-10-09 LAB
BASO+EOS+MONOS # BLD AUTO: 0.2 K/UL (ref 0–2.3)
BASO+EOS+MONOS # BLD AUTO: 5 % (ref 0.1–17)
DIFFERENTIAL METHOD BLD: ABNORMAL
ERYTHROCYTE [DISTWIDTH] IN BLOOD BY AUTOMATED COUNT: 12.7 % (ref 11.5–14.5)
HCT VFR BLD AUTO: 31 % (ref 36–48)
HGB BLD-MCNC: 10.2 G/DL (ref 12–16)
LYMPHOCYTES # BLD: 1.5 K/UL (ref 1.1–5.9)
LYMPHOCYTES NFR BLD: 40 % (ref 14–44)
MCH RBC QN AUTO: 30.7 PG (ref 25–35)
MCHC RBC AUTO-ENTMCNC: 32.9 G/DL (ref 31–37)
MCV RBC AUTO: 93.4 FL (ref 78–102)
NEUTS SEG # BLD: 2 K/UL (ref 1.8–9.5)
NEUTS SEG NFR BLD: 55 % (ref 40–70)
PLATELET # BLD AUTO: 142 K/UL (ref 140–440)
RBC # BLD AUTO: 3.32 M/UL (ref 4.1–5.1)
WBC # BLD AUTO: 3.7 K/UL (ref 4.5–13)

## 2017-11-08 ENCOUNTER — OFFICE VISIT (OUTPATIENT)
Dept: ONCOLOGY | Age: 77
End: 2017-11-08

## 2017-11-08 ENCOUNTER — HOSPITAL ENCOUNTER (OUTPATIENT)
Dept: ONCOLOGY | Age: 77
Discharge: HOME | End: 2017-11-08

## 2017-11-08 ENCOUNTER — HOSPITAL ENCOUNTER (OUTPATIENT)
Dept: LAB | Age: 77
Discharge: HOME | End: 2017-11-08
Payer: MEDICARE

## 2017-11-08 VITALS
TEMPERATURE: 97 F | SYSTOLIC BLOOD PRESSURE: 112 MMHG | DIASTOLIC BLOOD PRESSURE: 72 MMHG | WEIGHT: 128.4 LBS | HEART RATE: 77 BPM | BODY MASS INDEX: 22.04 KG/M2

## 2017-11-08 DIAGNOSIS — D69.6 THROMBOCYTOPENIA (HCC): ICD-10-CM

## 2017-11-08 DIAGNOSIS — D72.819 CHRONIC LEUKOPENIA: ICD-10-CM

## 2017-11-08 DIAGNOSIS — D63.1 ANEMIA ASSOCIATED WITH CHRONIC RENAL FAILURE: ICD-10-CM

## 2017-11-08 DIAGNOSIS — N18.9 ANEMIA ASSOCIATED WITH CHRONIC RENAL FAILURE: ICD-10-CM

## 2017-11-08 DIAGNOSIS — D46.9 MYELODYSPLASTIC SYNDROME (HCC): Primary | ICD-10-CM

## 2017-11-08 DIAGNOSIS — D46.9 MYELODYSPLASTIC SYNDROME (HCC): ICD-10-CM

## 2017-11-08 LAB
ALBUMIN SERPL-MCNC: 4.1 G/DL (ref 3.4–5)
ALBUMIN/GLOB SERPL: 1.2 {RATIO} (ref 0.8–1.7)
ALP SERPL-CCNC: 79 U/L (ref 45–117)
ALT SERPL-CCNC: 44 U/L (ref 13–56)
ANION GAP SERPL CALC-SCNC: 6 MMOL/L (ref 3–18)
AST SERPL-CCNC: 27 U/L (ref 15–37)
BASO+EOS+MONOS # BLD AUTO: 0.2 K/UL (ref 0–2.3)
BASO+EOS+MONOS # BLD AUTO: 6 % (ref 0.1–17)
BILIRUB SERPL-MCNC: 0.5 MG/DL (ref 0.2–1)
BUN SERPL-MCNC: 26 MG/DL (ref 7–18)
BUN/CREAT SERPL: 15 (ref 12–20)
CALCIUM SERPL-MCNC: 8.9 MG/DL (ref 8.5–10.1)
CHLORIDE SERPL-SCNC: 107 MMOL/L (ref 100–108)
CO2 SERPL-SCNC: 27 MMOL/L (ref 21–32)
CREAT SERPL-MCNC: 1.7 MG/DL (ref 0.6–1.3)
DIFFERENTIAL METHOD BLD: ABNORMAL
ERYTHROCYTE [DISTWIDTH] IN BLOOD BY AUTOMATED COUNT: 13.2 % (ref 11.5–14.5)
FERRITIN SERPL-MCNC: 150 NG/ML (ref 8–388)
GLOBULIN SER CALC-MCNC: 3.3 G/DL (ref 2–4)
GLUCOSE SERPL-MCNC: 114 MG/DL (ref 74–99)
HCT VFR BLD AUTO: 30.8 % (ref 36–48)
HGB BLD-MCNC: 10.5 G/DL (ref 12–16)
IRON SATN MFR SERPL: 21 %
IRON SERPL-MCNC: 57 UG/DL (ref 50–175)
LYMPHOCYTES # BLD: 1 K/UL (ref 1.1–5.9)
LYMPHOCYTES NFR BLD: 26 % (ref 14–44)
MCH RBC QN AUTO: 31.8 PG (ref 25–35)
MCHC RBC AUTO-ENTMCNC: 34.1 G/DL (ref 31–37)
MCV RBC AUTO: 93.3 FL (ref 78–102)
NEUTS SEG # BLD: 2.7 K/UL (ref 1.8–9.5)
NEUTS SEG NFR BLD: 68 % (ref 40–70)
PLATELET # BLD AUTO: 140 K/UL (ref 140–440)
POTASSIUM SERPL-SCNC: 4.5 MMOL/L (ref 3.5–5.5)
PROT SERPL-MCNC: 7.4 G/DL (ref 6.4–8.2)
RBC # BLD AUTO: 3.3 M/UL (ref 4.1–5.1)
SODIUM SERPL-SCNC: 140 MMOL/L (ref 136–145)
TIBC SERPL-MCNC: 277 UG/DL (ref 250–450)
WBC # BLD AUTO: 3.9 K/UL (ref 4.5–13)

## 2017-11-08 PROCEDURE — 80053 COMPREHEN METABOLIC PANEL: CPT | Performed by: INTERNAL MEDICINE

## 2017-11-08 PROCEDURE — 36415 COLL VENOUS BLD VENIPUNCTURE: CPT | Performed by: INTERNAL MEDICINE

## 2017-11-08 PROCEDURE — 82728 ASSAY OF FERRITIN: CPT | Performed by: INTERNAL MEDICINE

## 2017-11-08 PROCEDURE — 83540 ASSAY OF IRON: CPT | Performed by: INTERNAL MEDICINE

## 2017-11-08 NOTE — MR AVS SNAPSHOT
Visit Information Date & Time Provider Department Dept. Phone Encounter #  
 11/8/2017 10:00 AM Getachew Clay MD [facility] Oncology [phone] [id number] Follow-up Instructions Return in about 3 months (around 2/8/2018). Upcoming Health Maintenance Date Due DTaP/Tdap/Td series (1 - Tdap) 7/16/1962 ZOSTER VACCINE AGE 60> 5/16/2001 GLAUCOMA SCREENING Q2Y 7/16/2006 OSTEOPOROSIS SCREENING (DEXA) 7/16/2006 Pneumococcal 65+ High/Highest Risk (1 of 2 - PCV13) 7/16/2006 MEDICARE YEARLY EXAM 7/16/2006 Influenza Age 5 to Adult 8/1/2017 Allergies as of 11/8/2017  Review Complete On: 7/26/2017 By: Getachew Clay MD  
  
 Severity Noted Reaction Type Reactions Morphine    Unable to Obtain Neurontin [Gabapentin]  03/17/2015    Unknown (comments) Pcn [Penicillins]    Unable to Obtain Current Immunizations  Never Reviewed No immunizations on file. Not reviewed this visit You Were Diagnosed With   
  
 Codes Comments Myelodysplastic syndrome ([facility]    -  Primary ICD-10-CM: D46.9 ICD-9-CM: 238.75 Anemia associated with chronic renal failure     ICD-10-CM: D63.1 ICD-9-CM: 285.21 Chronic leukopenia     ICD-10-CM: D72.819 ICD-9-CM: 288.50 Thrombocytopenia ([facility]     ICD-10-CM: D69.6 ICD-9-CM: 287.5 Vitals BP Pulse Temp Weight(growth percentile) BMI Smoking Status 112/72 (BP 1 Location: Right arm, BP Patient Position: Sitting) 77 97 °F (36.1 °C) (Oral) 128 lb 6.4 oz (58.2 kg) 22.04 kg/m2 Never Smoker Vitals History BMI and BSA Data Body Mass Index Body Surface Area 22.04 kg/m 2 1.62 m 2 Your Updated Medication List  
  
   
This list is accurate as of: 11/8/17 11:37 AM.  Always use your most recent med list.  
  
  
  
  
 ELIQUIS 2.5 mg tablet Generic drug:  apixaban Take 2.5 mg by mouth two (2) times a day. hydroCHLOROthiazide 25 mg tablet Commonly known as:  HYDRODIURIL Take 25 mg by mouth daily. lisinopril 40 mg tablet Commonly known as:  Mollie Ripa Take 40 mg by mouth daily. NIFEdipine ER 90 mg ER tablet Commonly known as:  PROCARDIA XL Take 90 mg by mouth daily. ZOCOR 40 mg tablet Generic drug:  simvastatin Take  by mouth nightly. We Performed the Following COMPLETE CBC & AUTO DIFF WBC [21337 CPT(R)] Follow-up Instructions Return in about 3 months (around 2/8/2018). To-Do List   
 11/08/2017 Lab:  CBC WITH 3 PART DIFF   
  
 11/08/2017 Lab:  FERRITIN   
  
 11/08/2017 Lab:  IRON PROFILE   
  
 11/08/2017 Lab:  METABOLIC PANEL, COMPREHENSIVE Patient Instructions Myelodysplastic Syndromes: Care Instructions Your Care Instructions Myelodysplastic syndromes, also called MDS, are a group of rare conditions in which the bone marrow does not make enough healthy blood cells. Normally, the bone marrow makes red blood cells, white blood cells, and platelets. These cells carry oxygen in the blood, help the body fight infections, and help the blood clot. With MDS, you may feel weak and tired, get infections often, and bruise easily, although symptoms tend to vary. MDS is a form of blood cancer. In some cases, MDS can turn into acute myeloid leukemia (AML), another type of cancer. Some people develop MDS after treatment for cancer or exposure to pesticides or other chemicals. But in most cases, the cause of MDS is not known. Your doctor will use the results of blood tests to guide your treatment. There are many types of MDS, with different treatment plans for each. If you have enough red blood cells and are feeling all right, you may not need active treatment, but you and your doctor will want to watch your condition carefully. If you start feeling lightheaded and have no energy, you may need a blood transfusion.  Your doctor also may give you antibiotics to prevent or treat infection. Follow-up care is a key part of your treatment and safety. Be sure to make and go to all appointments, and call your doctor if you are having problems. It's also a good idea to know your test results and keep a list of the medicines you take. How can you care for yourself at home? · Take your medicines exactly as prescribed. Call your doctor if you think you are having a problem with your medicine. You will get more details on the specific medicines your doctor prescribes. · If your doctor prescribed antibiotics, take them as directed. Do not stop taking them just because you feel better. You need to take the full course of antibiotics. If you have side effects from antibiotics, tell your doctor. · Take steps to control your stress and workload. Learn relaxation techniques. ¨ Share your feelings. Stress and tension affect our emotions. By expressing your feelings to others, you may be able to understand and cope with them. ¨ Consider joining a support group. Talking about a problem with your spouse, a good friend, or other people with similar problems is a good way to reduce tension and stress. ¨ Express yourself with art. Try writing, crafts, dance, or art to relieve stress. ¨ Be kind to your body and mind. Getting enough sleep, eating a healthy diet, and taking time to do things you enjoy can contribute to an overall feeling of balance in your life and help reduce stress. ¨ Get help if you need it. Discuss your concerns with your doctor or counselor. · Do not smoke. Smoking can make blood problems worse. If you need help quitting, talk to your doctor about stop-smoking programs and medicines. These can increase your chances of quitting for good. · If you have not already done so, prepare a list of advance directives.  Advance directives are instructions to your doctor and family members about what kind of care you want if you become unable to speak or express yourself. · Call the [employer] ([phone]) or visit its website at [address] [employer] for more information. When should you call for help? Call 911 anytime you think you may need emergency care. For example, call if: 
? · You passed out (lost consciousness). ?Call your doctor now or seek immediate medical care if: 
? · You have a fever. ? · You have abnormal bleeding. ? · You have new or worse pain. ? · You think you have an infection. ? · You have new symptoms, such as a cough, belly pain, vomiting, diarrhea, or a rash. ? Watch closely for changes in your health, and be sure to contact your doctor if: 
? · You are much more tired than usual.  
? · You have swollen glands in your armpits, groin, or neck. ? · You do not get better as expected. Where can you learn more? Go to http://nichol-john.info/. Enter Marco Irwin in the search box to learn more about \"Myelodysplastic Syndromes: Care Instructions. \" Current as of: May 12, 2017 Content Version: 11.4 © 5187-9131 [employer]. Care instructions adapted under license by [employer] (which disclaims liability or warranty for this information). If you have questions about a medical condition or this instruction, always ask your healthcare professional. [address] any warranty or liability for your use of this information. Introducing [facility]! Christos Valenzuela introduces [employer] patient portal. Now you can access parts of your medical record, email your doctor's office, and request medication refills online. 1. In your internet browser, go to [email or web address] 2. Click on the First Time User? Click Here link in the Sign In box. You will see the New Member Sign Up page. 3. Enter your [employer] Access Code exactly as it appears below. You will not need to use this code after youve completed the sign-up process.  If you do not sign up before the expiration date, you must request a new code. · Man[employer] Access Code: [email or web address] Expires: 2/6/2018 10:14 AM 
 
4. Enter the last four digits of your Social Security Number (xxxx) and Date of Birth (mm/dd/yyyy) as indicated and click Submit. You will be taken to the next sign-up page. 5. Create a Manflu ID. This will be your Manflu login ID and cannot be changed, so think of one that is secure and easy to remember. 6. Create a Manflu password. You can change your password at any time. 7. Enter your Password Reset Question and Answer. This can be used at a later time if you forget your password. 8. Enter your e-mail address. You will receive e-mail notification when new information is available in [address] 9. Click Sign Up. You can now view and download portions of your medical record. 10. Click the Download Summary menu link to download a portable copy of your medical information. If you have questions, please visit the Frequently Asked Questions section of the Manflu website. Remember, Man[employer] is NOT to be used for urgent needs. For medical emergencies, dial 911. Now available from your iPhone and Android! Please provide this summary of care documentation to your next provider. Your primary care clinician is listed as Aristides Gomez. If you have any questions after today's visit, please call [phone].

## 2017-11-08 NOTE — PATIENT INSTRUCTIONS
Myelodysplastic Syndromes: Care Instructions  Your Care Instructions  Myelodysplastic syndromes, also called MDS, are a group of rare conditions in which the bone marrow does not make enough healthy blood cells. Normally, the bone marrow makes red blood cells, white blood cells, and platelets. These cells carry oxygen in the blood, help the body fight infections, and help the blood clot. With MDS, you may feel weak and tired, get infections often, and bruise easily, although symptoms tend to vary. MDS is a form of blood cancer. In some cases, MDS can turn into acute myeloid leukemia (AML), another type of cancer. Some people develop MDS after treatment for cancer or exposure to pesticides or other chemicals. But in most cases, the cause of MDS is not known. Your doctor will use the results of blood tests to guide your treatment. There are many types of MDS, with different treatment plans for each. If you have enough red blood cells and are feeling all right, you may not need active treatment, but you and your doctor will want to watch your condition carefully. If you start feeling lightheaded and have no energy, you may need a blood transfusion. Your doctor also may give you antibiotics to prevent or treat infection. Follow-up care is a key part of your treatment and safety. Be sure to make and go to all appointments, and call your doctor if you are having problems. It's also a good idea to know your test results and keep a list of the medicines you take. How can you care for yourself at home? · Take your medicines exactly as prescribed. Call your doctor if you think you are having a problem with your medicine. You will get more details on the specific medicines your doctor prescribes. · If your doctor prescribed antibiotics, take them as directed. Do not stop taking them just because you feel better. You need to take the full course of antibiotics.  If you have side effects from antibiotics, tell your doctor. · Take steps to control your stress and workload. Learn relaxation techniques. ¨ Share your feelings. Stress and tension affect our emotions. By expressing your feelings to others, you may be able to understand and cope with them. ¨ Consider joining a support group. Talking about a problem with your spouse, a good friend, or other people with similar problems is a good way to reduce tension and stress. ¨ Express yourself with art. Try writing, crafts, dance, or art to relieve stress. ¨ Be kind to your body and mind. Getting enough sleep, eating a healthy diet, and taking time to do things you enjoy can contribute to an overall feeling of balance in your life and help reduce stress. ¨ Get help if you need it. Discuss your concerns with your doctor or counselor. · Do not smoke. Smoking can make blood problems worse. If you need help quitting, talk to your doctor about stop-smoking programs and medicines. These can increase your chances of quitting for good. · If you have not already done so, prepare a list of advance directives. Advance directives are instructions to your doctor and family members about what kind of care you want if you become unable to speak or express yourself. · Call the [employer] (8-830.304.4481) or visit its website at [employer] [employer] for more information. When should you call for help? Call 911 anytime you think you may need emergency care. For example, call if:  ? · You passed out (lost consciousness). ?Call your doctor now or seek immediate medical care if:  ? · You have a fever. ? · You have abnormal bleeding. ? · You have new or worse pain. ? · You think you have an infection. ? · You have new symptoms, such as a cough, belly pain, vomiting, diarrhea, or a rash. ? Watch closely for changes in your health, and be sure to contact your doctor if:  ? · You are much more tired than usual.   ? · You have swollen glands in your armpits, groin, or neck.    ? · You do not get better as expected. Where can you learn more? Go to http://nichol-john.info/. Enter Lorenzo Greco in the search box to learn more about \"Myelodysplastic Syndromes: Care Instructions. \"  Current as of: May 12, 2017  Content Version: 11.4  © 4804-5195 [employer]. Care instructions adapted under license by [employer] (which disclaims liability or warranty for this information). If you have questions about a medical condition or this instruction, always ask your healthcare professional. [address] any warranty or liability for your use of this information.

## 2017-11-08 NOTE — PROGRESS NOTES
Hematology/Oncology  Progress Note    Name: Zayra Bingham  Date: 2017  : 1940    PCP:ANTHONY Lay MD     Ms. Velma Clements is a 69 y.o.  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 Current therapy: Procrit,  dose of 60,000 units whenever the hematocrit is below 30% and supportive care. Subjective:     Ms. Velma Clements is a 60-year-old woman who has a long-standing history of chronic kidney disease with associated anemia. She also has myelodysplastic syndrome. The patient has no complaint of weakness or fatigue. She also denies bleeding, blood loss or any bruising. She reports her appetite has improved greatly, she has also gained about 5 pounds. She states that she is doing well and feels fine at present. She is s/p right knee replacement and is continuing to use a cane for mobility support. Today she complains of feeling cold. Past medical history, family history, and social history: these were reviewed and remains unchanged. Past Medical History:   Diagnosis Date    Arthritis     Asthma     Breathing problem     Diabetes ([address]     Hypercholesterolemia     Hypertension     Iron deficiency anemia     Pacemaker     Ulcer ([address]      Past Surgical History:   Procedure Laterality Date    CARDIAC SURG PROCEDURE UNLIST      pacemaker    ENDOSCOPY, COLON, DIAGNOSTIC      HX HERNIA REPAIR      HX ORTHOPAEDIC      knee replacement     Social History     Social History    Marital status:      Spouse name: N/A    Number of children: N/A    Years of education: N/A     Occupational History    Not on file.      Social History Main Topics    Smoking status: Never Smoker    Smokeless tobacco: Not on file    Alcohol use No    Drug use: No    Sexual activity: Not on file     Other Topics Concern    Not on file     Social History Narrative     Family History   Problem Relation Age of Onset  Hypertension Other     Heart Disease Neg Hx     Cancer Neg Hx     Diabetes Neg Hx     Stroke Neg Hx      Current Outpatient Prescriptions   Medication Sig Dispense Refill    simvastatin (ZOCOR) 40 mg tablet Take  by mouth nightly.  apixaban (ELIQUIS) 2.5 mg tablet Take 2.5 mg by mouth two (2) times a day.  lisinopril (PRINIVIL, ZESTRIL) 40 mg tablet Take 40 mg by mouth daily.  NIFEdipine ER (PROCARDIA XL) 90 mg ER tablet Take 90 mg by mouth daily.  hydrochlorothiazide (HYDRODIURIL) 25 mg tablet Take 25 mg by mouth daily. Review of Systems  Constitutional: The patient has no acute distress or discomfort. HEENT: The patient denies recent head trauma, eye pain, blurred vision,  hearing deficit, oropharyngeal mucosal pain or lesions, and the patient denies throat pain or discomfort. Lymphatics: The patient denies palpable peripheral lymphadenopathy. Hematologic: The patient denies having bruising, bleeding, or progressive fatigue. Respiratory: Patient denies having shortness of breath, cough, sputum production, fever, or dyspnea on exertion. Cardiovascular: The patient denies having leg pain, leg swelling, heart palpitations, chest permit, chest pain, or lightheadedness. The patient denies having dyspnea on exertion. Gastrointestinal: The patient denies having nausea, emesis, or diarrhea. The patient denies having any hematemesis or blood in the stool. Genitourinary: Patient denies having urinary urgency, frequency, or dysuria. The patient denies having blood in the urine. Psychological: The patient denies having symptoms of nervousness, anxiety, depression, or thoughts of harming himself some of this. Skin: Patient denies having skin rashes, skin, ulcerations, or unexplained itching or pruritus. Musculoskeletal: The patient denies having pain in the joints or bones. S/p right knee replacement. .using a cane for mobility support.       Objective:     Visit Vitals    BP 112/72 (BP 1 Location: Right arm, BP Patient Position: Sitting)    Pulse 77    Temp 97 °F (36.1 °C) (Oral)    Wt 58.2 kg (128 lb 6.4 oz)    BMI 22.04 kg/m2     ECOG 1 PS 0/10  Physical Exam:   Gen. Appearance: The patient is in no acute distress. Skin: There is no bruise or rash. HEENT: The exam is unremarkable. Neck: Supple without lymphadenopathy or thyromegaly. Lungs: Clear to auscultation and percussion; there are no wheezes or rhonchi. Heart: Regular rate and rhythm; there are no murmurs, gallops, or rubs. Abdomen: Bowel sounds are present and normal.  There is no guarding, tenderness, or hepatosplenomegaly. Extremities: There is no clubbing, cyanosis, or edema. Neurologic: There are no focal neurologic deficits. Lymphatics: There is no palpable peripheral lymphadenopathy. Musculoskeletal: The patient has full range of motion at all joints. There is no evidence of joint deformity or effusions. There is no focal joint tenderness. Psychological/psychiatric: There is no clinical evidence of anxiety, depression, or melancholy. Lab data:      Results for orders placed or performed during the hospital encounter of 11/08/17   CBC WITH 3 PART DIFF     Status: Abnormal   Result Value Ref Range Status    WBC 3.9 (L) 4.5 - 13.0 K/uL Final    RBC 3.30 (L) 4.10 - 5.10 M/uL Final    HGB 10.5 (L) 12.0 - 16 g/dL Final    HCT 30.8 (L) 36 - 48 % Final    MCV 93.3 78 - 102 FL Final    MCH 31.8 25.0 - 35.0 PG Final    MCHC 34.1 31 - 37 g/dL Final    RDW 13.2 11.5 - 14.5 % Final    PLATELET 813 089 - 123 K/uL Final    NEUTROPHILS 68 40 - 70 % Final    MIXED CELLS 6 0.1 - 17 % Final    LYMPHOCYTES 26 14 - 44 % Final    ABS. NEUTROPHILS 2.7 1.8 - 9.5 K/UL Final    ABS. MIXED CELLS 0.2 0.0 - 2.3 K/uL Final    ABS. LYMPHOCYTES 1.0 (L) 1.1 - 5.9 K/UL Final     Comment: Test performed at [place] [address] Location. Results Reviewed by Medical Director. DF AUTOMATED   Final           Assessment:     1. Myelodysplastic syndrome ([facility]    2. Anemia associated with chronic renal failure    3. Chronic leukopenia    4. Thrombocytopenia ([facility]          Plan:   Myelodysplastic syndrome: I have explained to the patient that she has this chronic bone marrow disorder, which is contributing to her anemia, as well as her chronic kidney disease. The leukopenia is relatively stable at this time with a count of 3.9 and the platelet count is currently 140,000. This will continue to be monitored every 3 months for any evidence of progression. 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 I have explained to patient that her CBC today shows a WBC of 3.9, hemoglobin is 10.9 g/dL and hematocrit is 30.5%. I have told patient that therapeutic intervention will be indicated when her hemoglobin declines below 10 g/dl and hematocrit is below 30%. She will receive 60,000 units of Procrit subcutaneous injection once every 2 weeks or once a month. At this time, she will continue  taking ferrous sulfate once a day. Since her hemoglobin and hematocrit are now below 10 and 30 respectively we will schedule her to begin the Procrit as outlined. I will see her for follow up in clinic in 3 months. Chronic leukopenia: I have explained to patient that her WBC is stable today at 3.9. I will monitor this however if her WBC declines to 1.0 and absolute neutrophil also declines to 0.5,  she will need intervention in the form of Neupogen or Neulasta. No intervention is warranted at this time. Thrombocytopenia (resolved Problem): I have explained to patient that her Platelet today is normal at 140,000. She denies bleeding or bruising. Previous bone marrow biopsy did not show any evidence of lymphropliferative disorder. I will continue to monitor the Platelet counts on a regular basis. Therapeutic intervention would be recommended if the platelet count should decline below 30,000.         I will order her Ferritin, iron profile and compehrensive metabolic panel today and will see patient back for reassessment in 3 months.       Orders Placed This Encounter    COMPLETE CBC & AUTO DIFF WBC    InHouse CBC ([employer])     Standing Status:   Future     Number of Occurrences:   1     Standing Expiration Date:   52/01/8203    METABOLIC PANEL, COMPREHENSIVE     Standing Status:   Future     Standing Expiration Date:   11/9/2018    IRON PROFILE     Standing Status:   Future     Standing Expiration Date:   11/9/2018    FERRITIN     Standing Status:   Future     Standing Expiration Date:   11/9/2018       Jorgito Conte MD  11/8/2017

## 2018-04-09 ENCOUNTER — HOSPITAL ENCOUNTER (OUTPATIENT)
Dept: ONCOLOGY | Age: 78
Discharge: HOME | End: 2018-04-09

## 2018-04-09 ENCOUNTER — HOSPITAL ENCOUNTER (OUTPATIENT)
Dept: LAB | Age: 78
Discharge: HOME | End: 2018-04-09
Payer: MEDICARE

## 2018-04-09 ENCOUNTER — OFFICE VISIT (OUTPATIENT)
Dept: ONCOLOGY | Age: 78
End: 2018-04-09

## 2018-04-09 VITALS
HEIGHT: 64 IN | WEIGHT: 129.2 LBS | BODY MASS INDEX: 22.06 KG/M2 | TEMPERATURE: 97.6 F | DIASTOLIC BLOOD PRESSURE: 92 MMHG | SYSTOLIC BLOOD PRESSURE: 148 MMHG | HEART RATE: 82 BPM

## 2018-04-09 DIAGNOSIS — D46.9 MYELODYSPLASTIC SYNDROME (HCC): ICD-10-CM

## 2018-04-09 DIAGNOSIS — D72.819 CHRONIC LEUKOPENIA: ICD-10-CM

## 2018-04-09 DIAGNOSIS — D69.6 THROMBOCYTOPENIA (HCC): ICD-10-CM

## 2018-04-09 DIAGNOSIS — N18.30 ANEMIA, CHRONIC RENAL FAILURE, STAGE 3 (MODERATE) (HCC): ICD-10-CM

## 2018-04-09 DIAGNOSIS — D46.9 MYELODYSPLASTIC SYNDROME (HCC): Primary | ICD-10-CM

## 2018-04-09 DIAGNOSIS — D63.1 ANEMIA, CHRONIC RENAL FAILURE, STAGE 3 (MODERATE) (HCC): ICD-10-CM

## 2018-04-09 LAB
ALBUMIN SERPL-MCNC: 4.1 G/DL (ref 3.4–5)
ALBUMIN/GLOB SERPL: 1.4 {RATIO} (ref 0.8–1.7)
ALP SERPL-CCNC: 73 U/L (ref 45–117)
ALT SERPL-CCNC: 48 U/L (ref 13–56)
ANION GAP SERPL CALC-SCNC: 8 MMOL/L (ref 3–18)
AST SERPL-CCNC: 33 U/L (ref 15–37)
BASO+EOS+MONOS # BLD AUTO: 0.3 K/UL (ref 0–2.3)
BASO+EOS+MONOS # BLD AUTO: 6 % (ref 0.1–17)
BILIRUB SERPL-MCNC: 0.5 MG/DL (ref 0.2–1)
BUN SERPL-MCNC: 29 MG/DL (ref 7–18)
BUN/CREAT SERPL: 17 (ref 12–20)
CALCIUM SERPL-MCNC: 8.8 MG/DL (ref 8.5–10.1)
CHLORIDE SERPL-SCNC: 108 MMOL/L (ref 100–108)
CO2 SERPL-SCNC: 25 MMOL/L (ref 21–32)
CREAT SERPL-MCNC: 1.74 MG/DL (ref 0.6–1.3)
DIFFERENTIAL METHOD BLD: ABNORMAL
ERYTHROCYTE [DISTWIDTH] IN BLOOD BY AUTOMATED COUNT: 12.9 % (ref 11.5–14.5)
FERRITIN SERPL-MCNC: 130 NG/ML (ref 8–388)
GLOBULIN SER CALC-MCNC: 3 G/DL (ref 2–4)
GLUCOSE SERPL-MCNC: 103 MG/DL (ref 74–99)
HCT VFR BLD AUTO: 31.4 % (ref 36–48)
HGB BLD-MCNC: 10.3 G/DL (ref 12–16)
IRON SATN MFR SERPL: 30 %
IRON SERPL-MCNC: 86 UG/DL (ref 50–175)
LYMPHOCYTES # BLD: 1 K/UL (ref 1.1–5.9)
LYMPHOCYTES NFR BLD: 18 % (ref 14–44)
MCH RBC QN AUTO: 31.2 PG (ref 25–35)
MCHC RBC AUTO-ENTMCNC: 32.8 G/DL (ref 31–37)
MCV RBC AUTO: 95.2 FL (ref 78–102)
NEUTS SEG # BLD: 4.2 K/UL (ref 1.8–9.5)
NEUTS SEG NFR BLD: 76 % (ref 40–70)
PLATELET # BLD AUTO: 146 K/UL (ref 140–440)
POTASSIUM SERPL-SCNC: 5 MMOL/L (ref 3.5–5.5)
PROT SERPL-MCNC: 7.1 G/DL (ref 6.4–8.2)
RBC # BLD AUTO: 3.3 M/UL (ref 4.1–5.1)
SODIUM SERPL-SCNC: 141 MMOL/L (ref 136–145)
TIBC SERPL-MCNC: 288 UG/DL (ref 250–450)
WBC # BLD AUTO: 5.5 K/UL (ref 4.5–13)

## 2018-04-09 PROCEDURE — 36415 COLL VENOUS BLD VENIPUNCTURE: CPT | Performed by: INTERNAL MEDICINE

## 2018-04-09 PROCEDURE — 83540 ASSAY OF IRON: CPT | Performed by: INTERNAL MEDICINE

## 2018-04-09 PROCEDURE — 80053 COMPREHEN METABOLIC PANEL: CPT | Performed by: INTERNAL MEDICINE

## 2018-04-09 PROCEDURE — 82728 ASSAY OF FERRITIN: CPT | Performed by: INTERNAL MEDICINE

## 2018-04-09 NOTE — PATIENT INSTRUCTIONS
Anemia From Chronic Disease: Care Instructions  Your Care Instructions    Anemia is a low level of red blood cells. Red blood cells carry oxygen from your lungs to the rest of your body. Sometimes when you have a long-term (chronic) disease, such as kidney disease, arthritis, diabetes, cancer, or an infection, your body does not make enough red blood cells. Follow-up care is a key part of your treatment and safety. Be sure to make and go to all appointments, and call your doctor if you are having problems. It's also a good idea to know your test results and keep a list of the medicines you take. How can you care for yourself at home? · Follow your doctor's instructions to treat the chronic condition that is causing the anemia. · Be safe with medicines. Take your medicine to treat your chronic condition exactly as prescribed. Call your doctor if you think you are having a problem with your medicine. · Take your medicine for anemia exactly as prescribed. Call your doctor if you think you are having a problem with your medicine. Medicines to increase the number of red blood cells (such as epoetin or darbepoetin) may be given as an injection. ¨ If you miss a dose, take it as soon as you can, unless it is almost time for your next dose. In that case, get back on your regular schedule and take only one dose. ¨ Do not freeze this medicine. Store it in the refrigerator. Do not shake the bottle before you prepare the shot. · Keep all your appointments for blood tests to check on your hemoglobin levels. When should you call for help? Call 911 anytime you think you may need emergency care. For example, call if:  ? · You passed out (lost consciousness). ?Call your doctor now or seek immediate medical care if:  ? · You are short of breath. ? · You are dizzy or light-headed, or you feel like you may faint. ? · You have new or worse bleeding. ? Watch closely for changes in your health, and be sure to contact your doctor if:  ? · You feel weaker or more tired than usual.   ? · You do not get better as expected. Where can you learn more? Go to http://nichol-john.info/. Enter E502 in the search box to learn more about \"Anemia From Chronic Disease: Care Instructions. \"  Current as of: October 13, 2016  Content Version: 11.4  © 9050-6323 [employer]. Care instructions adapted under license by [employer] (which disclaims liability or warranty for this information). If you have questions about a medical condition or this instruction, always ask your healthcare professional. [place] [address] any warranty or liability for your use of this information.

## 2018-04-09 NOTE — MR AVS SNAPSHOT
Desiree Ames 
 
 
 [facility] [address] [address] [address] [facility] 
[phone] Patient: Sasha Munoz MRN: [id number] YYC:[birth year] Visit Information Date & Time Provider Department Dept. Phone Encounter #  
 4/9/2018  9:15 AM Marylee Harms, MD [facility] [phone] [id number] Follow-up Instructions Return in about 3 months (around 7/9/2018). Your Appointments 7/9/2018  9:15 AM  
Office Visit with Marylee Harms, MD  
[facility]) Appt Note: 3 MO RET  
 [address] [place] [address], [address] [place] [address] Upcoming Health Maintenance Date Due DTaP/Tdap/Td series (1 - Tdap) 7/16/1962 ZOSTER VACCINE AGE 60> 5/16/2001 GLAUCOMA SCREENING Q2Y 7/16/2006 Bone Densitometry (Dexa) Screening 7/16/2006 Pneumococcal 65+ High/Highest Risk (1 of 2 - PCV13) 7/16/2006 Influenza Age 5 to Adult 8/1/2017 MEDICARE YEARLY EXAM 3/14/2018 Allergies as of 4/9/2018  Review Complete On: 4/9/2018 By: Marylee Harms, MD  
  
 Severity Noted Reaction Type Reactions Morphine    Unable to Obtain Neurontin [Gabapentin]  03/17/2015    Unknown (comments) Pcn [Penicillins]    Unable to Obtain Current Immunizations  Never Reviewed No immunizations on file. Not reviewed this visit You Were Diagnosed With   
  
 Codes Comments Myelodysplastic syndrome ([facility]    -  Primary ICD-10-CM: D46.9 ICD-9-CM: 238.75 Chronic leukopenia     ICD-10-CM: D72.819 ICD-9-CM: 288.50 Thrombocytopenia ([facility]     ICD-10-CM: D69.6 ICD-9-CM: 287.5 Anemia, chronic renal failure, stage 3 (moderate)     ICD-10-CM: N18.3, D63.1 ICD-9-CM: 285.21, 585.3 Vitals BP Pulse Temp Height(growth percentile) Weight(growth percentile) BMI (!) 148/92 82 97.6 °F (36.4 °C) 5' 4\" (1.626 m) 129 lb 3.2 oz (58.6 kg) 22.18 kg/m2 Smoking Status Never Smoker BMI and BSA Data Body Mass Index Body Surface Area  
 22.18 kg/m 2 1.63 m 2 Your Updated Medication List  
  
   
This list is accurate as of 4/9/18 10:16 AM.  Always use your most recent med list.  
  
  
  
  
 ELIQUIS 2.5 mg tablet Generic drug:  apixaban Take 2.5 mg by mouth two (2) times a day. hydroCHLOROthiazide 25 mg tablet Commonly known as:  HYDRODIURIL Take 25 mg by mouth daily. lisinopril 40 mg tablet Commonly known as:  Marivel Golder Take 40 mg by mouth daily. NIFEdipine ER 90 mg ER tablet Commonly known as:  PROCARDIA XL Take 90 mg by mouth daily. ZOCOR 40 mg tablet Generic drug:  simvastatin Take  by mouth nightly. We Performed the Following COMPLETE CBC & AUTO DIFF WBC [89698 CPT(R)] Follow-up Instructions Return in about 3 months (around 7/9/2018). To-Do List   
 04/09/2018 Lab:  CBC WITH 3 PART DIFF Patient Instructions Anemia From Chronic Disease: Care Instructions Your Care Instructions Anemia is a low level of red blood cells. Red blood cells carry oxygen from your lungs to the rest of your body. Sometimes when you have a long-term (chronic) disease, such as kidney disease, arthritis, diabetes, cancer, or an infection, your body does not make enough red blood cells. Follow-up care is a key part of your treatment and safety. Be sure to make and go to all appointments, and call your doctor if you are having problems. It's also a good idea to know your test results and keep a list of the medicines you take. How can you care for yourself at home? · Follow your doctor's instructions to treat the chronic condition that is causing the anemia. · Be safe with medicines.  Take your medicine to treat your chronic condition exactly as prescribed. Call your doctor if you think you are having a problem with your medicine. · Take your medicine for anemia exactly as prescribed. Call your doctor if you think you are having a problem with your medicine. Medicines to increase the number of red blood cells (such as epoetin or darbepoetin) may be given as an injection. ¨ If you miss a dose, take it as soon as you can, unless it is almost time for your next dose. In that case, get back on your regular schedule and take only one dose. ¨ Do not freeze this medicine. Store it in the refrigerator. Do not shake the bottle before you prepare the shot. · Keep all your appointments for blood tests to check on your hemoglobin levels. When should you call for help? Call 911 anytime you think you may need emergency care. For example, call if: 
? · You passed out (lost consciousness). ?Call your doctor now or seek immediate medical care if: 
? · You are short of breath. ? · You are dizzy or light-headed, or you feel like you may faint. ? · You have new or worse bleeding. ? Watch closely for changes in your health, and be sure to contact your doctor if: 
? · You feel weaker or more tired than usual.  
? · You do not get better as expected. Where can you learn more? Go to http://nichol-john.info/. Enter E502 in the search box to learn more about \"Anemia From Chronic Disease: Care Instructions. \" Current as of: October 13, 2016 Content Version: 11.4 © 3512-9387 [employer]. Care instructions adapted under license by [employer] (which disclaims liability or warranty for this information). If you have questions about a medical condition or this instruction, always ask your healthcare professional. [place] [address] any warranty or liability for your use of this information. Introducing [facility]! [facility] introduces DVTel patient portal. Now you can access parts of your medical record, email your doctor's office, and request medication refills online. 1. In your internet browser, go to [email or web address] 2. Click on the First Time User? Click Here link in the Sign In box. You will see the New Member Sign Up page. 3. Enter your DVTel Access Code exactly as it appears below. You will not need to use this code after youve completed the sign-up process. If you do not sign up before the expiration date, you must request a new code. · DVTel Access Code: [email or web address] Expires: 7/8/2018  9:14 AM 
 
4. Enter the last four digits of your Social Security Number (xxxx) and Date of Birth (mm/dd/yyyy) as indicated and click Submit. You will be taken to the next sign-up page. 5. Create a DVTel ID. This will be your DVTel login ID and cannot be changed, so think of one that is secure and easy to remember. 6. Create a DVTel password. You can change your password at any time. 7. Enter your Password Reset Question and Answer. This can be used at a later time if you forget your password. 8. Enter your e-mail address. You will receive e-mail notification when new information is available in [address] 9. Click Sign Up. You can now view and download portions of your medical record. 10. Click the Download Summary menu link to download a portable copy of your medical information. If you have questions, please visit the Frequently Asked Questions section of the DVTel website. Remember, DVTel is NOT to be used for urgent needs. For medical emergencies, dial 911. Now available from your iPhone and Android! Please provide this summary of care documentation to your next provider. Your primary care clinician is listed as Donny Owusu. If you have any questions after today's visit, please call [phone].

## 2018-04-09 NOTE — PROGRESS NOTES
Hematology/Oncology  Progress Note    Name: Gemma Stover  Date: 2018  : 1940    PCP:ANTHONY Yi MD     Ms. Kannan Woo is a 69 y.o.  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 Current therapy: Procrit,  dose of 60,000 units whenever the hematocrit is below 30% and supportive care. Subjective:     Ms. Kannan Woo is a 69-year-old woman who has a long-standing history of chronic kidney disease with associated anemia. She also has myelodysplastic syndrome. The patient has no complaint of weakness or fatigue. She also denies bleeding, blood loss or any bruising. She reports her appetite has improved greatly, she is continuing to slowly gain weight. She states that she is doing well and feels fine at present. She is s/p right knee replacement and is continuing to use a cane for mobility support. Today she complains of feeling cold. Past medical history, family history, and social history: these were reviewed and remains unchanged. Past Medical History:   Diagnosis Date    Arthritis     Asthma     Breathing problem     Diabetes ([address]     Hypercholesterolemia     Hypertension     Iron deficiency anemia     Pacemaker     Ulcer ([address]      Past Surgical History:   Procedure Laterality Date    CARDIAC SURG PROCEDURE UNLIST      pacemaker    ENDOSCOPY, COLON, DIAGNOSTIC      HX HERNIA REPAIR      HX ORTHOPAEDIC      knee replacement     Social History     Social History    Marital status:      Spouse name: N/A    Number of children: N/A    Years of education: N/A     Occupational History    Not on file.      Social History Main Topics    Smoking status: Never Smoker    Smokeless tobacco: Not on file    Alcohol use No    Drug use: No    Sexual activity: Not on file     Other Topics Concern    Not on file     Social History Narrative     Family History   Problem Relation Age of Onset    Hypertension Other     Heart Disease Neg Hx     Cancer Neg Hx     Diabetes Neg Hx     Stroke Neg Hx      Current Outpatient Prescriptions   Medication Sig Dispense Refill    simvastatin (ZOCOR) 40 mg tablet Take  by mouth nightly.  apixaban (ELIQUIS) 2.5 mg tablet Take 2.5 mg by mouth two (2) times a day.  lisinopril (PRINIVIL, ZESTRIL) 40 mg tablet Take 40 mg by mouth daily.  NIFEdipine ER (PROCARDIA XL) 90 mg ER tablet Take 90 mg by mouth daily.  hydrochlorothiazide (HYDRODIURIL) 25 mg tablet Take 25 mg by mouth daily. Review of Systems  Constitutional: The patient has no acute distress or discomfort. HEENT: The patient denies recent head trauma, eye pain, blurred vision,  hearing deficit, oropharyngeal mucosal pain or lesions, and the patient denies throat pain or discomfort. Lymphatics: The patient denies palpable peripheral lymphadenopathy. Hematologic: The patient denies having bruising, bleeding, or progressive fatigue. Respiratory: Patient denies having shortness of breath, cough, sputum production, fever, or dyspnea on exertion. Cardiovascular: The patient denies having leg pain, leg swelling, heart palpitations, chest permit, chest pain, or lightheadedness. The patient denies having dyspnea on exertion. Gastrointestinal: The patient denies having nausea, emesis, or diarrhea. The patient denies having any hematemesis or blood in the stool. Genitourinary: Patient denies having urinary urgency, frequency, or dysuria. The patient denies having blood in the urine. Psychological: The patient denies having symptoms of nervousness, anxiety, depression, or thoughts of harming himself some of this. Skin: Patient denies having skin rashes, skin, ulcerations, or unexplained itching or pruritus. Musculoskeletal: The patient denies having pain in the joints or bones. S/p right knee replacement. .using a cane for mobility support.       Objective:     Visit Vitals    BP (!) 148/92    Pulse 82    Temp 97.6 °F (36.4 °C)    Ht 5' 4\" (1.626 m)    Wt 58.6 kg (129 lb 3.2 oz)    BMI 22.18 kg/m2     ECOG 1 PS 0/10  Physical Exam:   Gen. Appearance: The patient is in no acute distress. Skin: There is no bruise or rash. HEENT: The exam is unremarkable. Neck: Supple without lymphadenopathy or thyromegaly. Lungs: Clear to auscultation and percussion; there are no wheezes or rhonchi. Heart: Regular rate and rhythm; there are no murmurs, gallops, or rubs. Abdomen: Bowel sounds are present and normal.  There is no guarding, tenderness, or hepatosplenomegaly. Extremities: There is no clubbing, cyanosis, or edema. Neurologic: There are no focal neurologic deficits. Lymphatics: There is no palpable peripheral lymphadenopathy. Musculoskeletal: The patient has full range of motion at all joints. There is no evidence of joint deformity or effusions. There is no focal joint tenderness. Psychological/psychiatric: There is no clinical evidence of anxiety, depression, or melancholy. Lab data:      Results for orders placed or performed during the hospital encounter of 04/09/18   CBC WITH 3 PART DIFF     Status: Abnormal   Result Value Ref Range Status    WBC 5.5 4.5 - 13.0 K/uL Final    RBC 3.30 (L) 4.10 - 5.10 M/uL Final    HGB 10.3 (L) 12.0 - 16 g/dL Final    HCT 31.4 (L) 36 - 48 % Final    MCV 95.2 78 - 102 FL Final    MCH 31.2 25.0 - 35.0 PG Final    MCHC 32.8 31 - 37 g/dL Final    RDW 12.9 11.5 - 14.5 % Final    PLATELET 711 916 - 596 K/uL Final    NEUTROPHILS 76 (H) 40 - 70 % Final    MIXED CELLS 6 0.1 - 17 % Final    LYMPHOCYTES 18 14 - 44 % Final    ABS. NEUTROPHILS 4.2 1.8 - 9.5 K/UL Final    ABS. MIXED CELLS 0.3 0.0 - 2.3 K/uL Final    ABS. LYMPHOCYTES 1.0 (L) 1.1 - 5.9 K/UL Final     Comment: Test performed at [address]. Results Reviewed by Medical Director. DF AUTOMATED   Final           Assessment:     1. Myelodysplastic syndrome ([facility]    2. Chronic leukopenia    3. Thrombocytopenia ([address]    4. Anemia, chronic renal failure, stage 3 (moderate)          Plan:   Myelodysplastic syndrome: I have explained to the patient that she has this chronic bone marrow disorder, which is contributing to her anemia, as well as her chronic kidney disease. The leukopenia is relatively stable at this time with a count of 5.5 and the platelet count is currently 146,000. This will continue to be monitored every 3 months for any evidence of progression. 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 I have explained to patient that her CBC today shows a WBC of 5.5, hemoglobin is 10.3 g/dL and hematocrit is 31.4 %. I have told patient that therapeutic intervention will be indicated when her hemoglobin declines below 10 g/dl and hematocrit is below 30%. She will receive 60,000 units of Procrit subcutaneous injection once every 2 weeks or once a month. At this time, she will continue  taking ferrous sulfate once a day. Since her hemoglobin and hematocrit are now below 10 and 30 respectively we will schedule her to begin the Procrit as outlined. I will see her for follow up in clinic in 3 months. Chronic leukopenia: I have explained to patient that her WBC is stable today at 5.5. I will monitor this however if her WBC declines to 1.0 and absolute neutrophil also declines to 0.5,  she will need intervention in the form of Neupogen or Neulasta. No intervention is warranted at this time. Thrombocytopenia (resolved Problem): I have explained to patient that her Platelet today is normal at 146,000. She denies bleeding or bruising. Previous bone marrow biopsy did not show any evidence of lymphropliferative disorder. I will continue to monitor the Platelet counts on a regular basis. Therapeutic intervention would be recommended if the platelet count should decline below 30,000.         I will order her Ferritin, iron profile and compehrensive metabolic panel today and will see patient back for reassessment in 3 months.       Orders Placed This Encounter    COMPLETE CBC & AUTO DIFF WBC    InHouse CBC ([employer])     Standing Status:   Future     Number of Occurrences:   1     Standing Expiration Date:   2/18/2646    METABOLIC PANEL, COMPREHENSIVE     Standing Status:   Future     Number of Occurrences:   1     Standing Expiration Date:   4/10/2019    IRON PROFILE     Standing Status:   Future     Number of Occurrences:   1     Standing Expiration Date:   4/10/2019    FERRITIN     Standing Status:   Future     Number of Occurrences:   1     Standing Expiration Date:   4/10/2019       Gene Powers MD

## 2018-07-09 ENCOUNTER — OFFICE VISIT (OUTPATIENT)
Dept: ONCOLOGY | Age: 78
End: 2018-07-09

## 2018-07-09 ENCOUNTER — HOSPITAL ENCOUNTER (OUTPATIENT)
Dept: LAB | Age: 78
Discharge: HOME | End: 2018-07-09
Payer: MEDICARE

## 2018-07-09 ENCOUNTER — HOSPITAL ENCOUNTER (OUTPATIENT)
Dept: ONCOLOGY | Age: 78
Discharge: HOME | End: 2018-07-09

## 2018-07-09 VITALS
WEIGHT: 128 LBS | BODY MASS INDEX: 21.97 KG/M2 | SYSTOLIC BLOOD PRESSURE: 145 MMHG | HEART RATE: 81 BPM | TEMPERATURE: 97 F | DIASTOLIC BLOOD PRESSURE: 88 MMHG

## 2018-07-09 DIAGNOSIS — D63.1 ANEMIA, CHRONIC RENAL FAILURE, STAGE 3 (MODERATE) (HCC): ICD-10-CM

## 2018-07-09 DIAGNOSIS — D72.819 CHRONIC LEUKOPENIA: ICD-10-CM

## 2018-07-09 DIAGNOSIS — N18.30 ANEMIA, CHRONIC RENAL FAILURE, STAGE 3 (MODERATE) (HCC): ICD-10-CM

## 2018-07-09 DIAGNOSIS — D46.9 MYELODYSPLASTIC SYNDROME (HCC): Primary | ICD-10-CM

## 2018-07-09 DIAGNOSIS — D47.1 MYELOPROLIFERATIVE DISORDER (HCC): ICD-10-CM

## 2018-07-09 DIAGNOSIS — R63.0 POOR APPETITE: ICD-10-CM

## 2018-07-09 DIAGNOSIS — D46.9 MYELODYSPLASTIC SYNDROME (HCC): ICD-10-CM

## 2018-07-09 DIAGNOSIS — D69.6 THROMBOCYTOPENIA (HCC): ICD-10-CM

## 2018-07-09 LAB
ALBUMIN SERPL-MCNC: 4 G/DL (ref 3.4–5)
ALBUMIN/GLOB SERPL: 1.5 {RATIO} (ref 0.8–1.7)
ALP SERPL-CCNC: 72 U/L (ref 45–117)
ALT SERPL-CCNC: 45 U/L (ref 13–56)
ANION GAP SERPL CALC-SCNC: 6 MMOL/L (ref 3–18)
AST SERPL-CCNC: 27 U/L (ref 15–37)
BASO+EOS+MONOS # BLD AUTO: 0.2 K/UL (ref 0–2.3)
BASO+EOS+MONOS # BLD AUTO: 4 % (ref 0.1–17)
BILIRUB SERPL-MCNC: 0.6 MG/DL (ref 0.2–1)
BUN SERPL-MCNC: 28 MG/DL (ref 7–18)
BUN/CREAT SERPL: 16 (ref 12–20)
CALCIUM SERPL-MCNC: 9.1 MG/DL (ref 8.5–10.1)
CHLORIDE SERPL-SCNC: 108 MMOL/L (ref 100–108)
CO2 SERPL-SCNC: 28 MMOL/L (ref 21–32)
CREAT SERPL-MCNC: 1.78 MG/DL (ref 0.6–1.3)
DIFFERENTIAL METHOD BLD: ABNORMAL
ERYTHROCYTE [DISTWIDTH] IN BLOOD BY AUTOMATED COUNT: 12.2 % (ref 11.5–14.5)
FERRITIN SERPL-MCNC: 113 NG/ML (ref 8–388)
GLOBULIN SER CALC-MCNC: 2.7 G/DL (ref 2–4)
GLUCOSE SERPL-MCNC: 132 MG/DL (ref 74–99)
HCT VFR BLD AUTO: 31.7 % (ref 36–48)
HGB BLD-MCNC: 10.6 G/DL (ref 12–16)
IRON SATN MFR SERPL: 29 %
IRON SERPL-MCNC: 80 UG/DL (ref 50–175)
LYMPHOCYTES # BLD: 1.1 K/UL (ref 1.1–5.9)
LYMPHOCYTES NFR BLD: 27 % (ref 14–44)
MCH RBC QN AUTO: 31.7 PG (ref 25–35)
MCHC RBC AUTO-ENTMCNC: 33.4 G/DL (ref 31–37)
MCV RBC AUTO: 94.9 FL (ref 78–102)
NEUTS SEG # BLD: 2.8 K/UL (ref 1.8–9.5)
NEUTS SEG NFR BLD: 69 % (ref 40–70)
PLATELET # BLD AUTO: 129 K/UL (ref 140–440)
POTASSIUM SERPL-SCNC: 5.7 MMOL/L (ref 3.5–5.5)
PROT SERPL-MCNC: 6.7 G/DL (ref 6.4–8.2)
RBC # BLD AUTO: 3.34 M/UL (ref 4.1–5.1)
SODIUM SERPL-SCNC: 142 MMOL/L (ref 136–145)
TIBC SERPL-MCNC: 278 UG/DL (ref 250–450)
WBC # BLD AUTO: 4.1 K/UL (ref 4.5–13)

## 2018-07-09 PROCEDURE — 80053 COMPREHEN METABOLIC PANEL: CPT | Performed by: INTERNAL MEDICINE

## 2018-07-09 PROCEDURE — 82728 ASSAY OF FERRITIN: CPT | Performed by: INTERNAL MEDICINE

## 2018-07-09 PROCEDURE — 83540 ASSAY OF IRON: CPT | Performed by: INTERNAL MEDICINE

## 2018-07-09 PROCEDURE — 36415 COLL VENOUS BLD VENIPUNCTURE: CPT | Performed by: INTERNAL MEDICINE

## 2018-07-09 NOTE — PROGRESS NOTES
Hematology/Oncology  Progress Note    Name: Cora Del Rio  Date: 2018  : 1940    PCP:ANTHONY Kiran MD     Ms. Darrian Kapoor is a 69 y.o.  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 Current therapy: Procrit,  dose of 60,000 units whenever the hematocrit is below 30% and supportive care. Subjective:     Ms. Darrian Kapoor is a 50-year-old woman who has a long-standing history of chronic kidney disease with associated anemia. She also has myelodysplastic syndrome. The patient has no complaint of weakness or fatigue. She also denies bleeding, blood loss or any bruising. She reports her appetite has improved greatly, she is continuing to slowly gain weight. She states that she is doing well and feels fine at present. She is s/p right knee replacement and is continuing to use a cane for mobility support. Today, she has no additional complaints or concerns to report. Past medical history, family history, and social history: these were reviewed and remains unchanged. Past Medical History:   Diagnosis Date    Arthritis     Asthma     Breathing problem     Diabetes ([address]     Hypercholesterolemia     Hypertension     Iron deficiency anemia     Pacemaker     Ulcer      Past Surgical History:   Procedure Laterality Date    CARDIAC SURG PROCEDURE UNLIST      pacemaker    ENDOSCOPY, COLON, DIAGNOSTIC      HX HERNIA REPAIR      HX ORTHOPAEDIC      knee replacement     Social History     Social History    Marital status:      Spouse name: N/A    Number of children: N/A    Years of education: N/A     Occupational History    Not on file.      Social History Main Topics    Smoking status: Never Smoker    Smokeless tobacco: Never Used    Alcohol use No    Drug use: No    Sexual activity: Not on file     Other Topics Concern    Not on file     Social History Narrative     Family History   Problem Relation Age of Onset    Hypertension Other     Heart Disease Neg Hx     Cancer Neg Hx     Diabetes Neg Hx     Stroke Neg Hx      Current Outpatient Prescriptions   Medication Sig Dispense Refill    simvastatin (ZOCOR) 40 mg tablet Take  by mouth nightly.  apixaban (ELIQUIS) 2.5 mg tablet Take 2.5 mg by mouth two (2) times a day.  lisinopril (PRINIVIL, ZESTRIL) 40 mg tablet Take 40 mg by mouth daily.  NIFEdipine ER (PROCARDIA XL) 90 mg ER tablet Take 90 mg by mouth daily.  hydrochlorothiazide (HYDRODIURIL) 25 mg tablet Take 25 mg by mouth daily. Review of Systems  Constitutional: The patient has no acute distress or discomfort. HEENT: The patient denies recent head trauma, eye pain, blurred vision,  hearing deficit, oropharyngeal mucosal pain or lesions, and the patient denies throat pain or discomfort. Lymphatics: The patient denies palpable peripheral lymphadenopathy. Hematologic: The patient denies having bruising, bleeding, or progressive fatigue. Respiratory: Patient denies having shortness of breath, cough, sputum production, fever, or dyspnea on exertion. Cardiovascular: The patient denies having leg pain, leg swelling, heart palpitations, chest permit, chest pain, or lightheadedness. The patient denies having dyspnea on exertion. Gastrointestinal: The patient denies having nausea, emesis, or diarrhea. The patient denies having any hematemesis or blood in the stool. Genitourinary: Patient denies having urinary urgency, frequency, or dysuria. The patient denies having blood in the urine. Psychological: The patient denies having symptoms of nervousness, anxiety, depression, or thoughts of harming himself some of this. Skin: Patient denies having skin rashes, skin, ulcerations, or unexplained itching or pruritus. Musculoskeletal: The patient denies having pain in the joints or bones. S/p right knee replacement. .using a cane for mobility support.       Objective: Visit Vitals    /88    Pulse 81    Temp 97 °F (36.1 °C)    Wt 58.1 kg (128 lb)    BMI 21.97 kg/m2     ECOG 1 PS 0/10  Physical Exam:   Gen. Appearance: The patient is in no acute distress. Skin: There is no bruise or rash. HEENT: The exam is unremarkable. Neck: Supple without lymphadenopathy or thyromegaly. Lungs: Clear to auscultation and percussion; there are no wheezes or rhonchi. Heart: Regular rate and rhythm; there are no murmurs, gallops, or rubs. Abdomen: Bowel sounds are present and normal.  There is no guarding, tenderness, or hepatosplenomegaly. Extremities: There is no clubbing, cyanosis, or edema. Neurologic: There are no focal neurologic deficits. Lymphatics: There is no palpable peripheral lymphadenopathy. Musculoskeletal: The patient has full range of motion at all joints. There is no evidence of joint deformity or effusions. There is no focal joint tenderness. Psychological/psychiatric: There is no clinical evidence of anxiety, depression, or melancholy. Lab data:      Results for orders placed or performed during the hospital encounter of 07/09/18   CBC WITH 3 PART DIFF     Status: Abnormal   Result Value Ref Range Status    WBC 4.1 (L) 4.5 - 13.0 K/uL Final    RBC 3.34 (L) 4.10 - 5.10 M/uL Final    HGB 10.6 (L) 12.0 - 16 g/dL Final    HCT 31.7 (L) 36 - 48 % Final    MCV 94.9 78 - 102 FL Final    MCH 31.7 25.0 - 35.0 PG Final    MCHC 33.4 31 - 37 g/dL Final    RDW 12.2 11.5 - 14.5 % Final    PLATELET 255 (L) 269 - 440 K/uL Final    NEUTROPHILS 69 40 - 70 % Final    MIXED CELLS 4 0.1 - 17 % Final    LYMPHOCYTES 27 14 - 44 % Final    ABS. NEUTROPHILS 2.8 1.8 - 9.5 K/UL Final    ABS. MIXED CELLS 0.2 0.0 - 2.3 K/uL Final    ABS. LYMPHOCYTES 1.1 1.1 - 5.9 K/UL Final     Comment: Test performed at [address] Results Reviewed by Medical Director. DF AUTOMATED   Final           Assessment:     1. [facility]elodysplastic syndrome ([facility]    2. Myeloproliferative disorder ([facility]    3. Anemia, chronic renal failure, stage 3 (moderate)    4. Chronic leukopenia    5. Poor appetite    6. Thrombocytopenia ([facility]          Plan:   Myelodysplastic syndrome/myeloproliferative disorder: I have explained to the patient that she has this chronic bone marrow disorder, which is contributing to her anemia, as well as her chronic kidney disease. The leukopenia is relatively stable at this time with a WBC count of 4.1 and the platelet count is currently 129,000. This will continue to be monitored every 3 months for any evidence of progression. 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 I have explained to patient that her CBC today shows a WBC of 4.1, hemoglobin is 10.6 g/dL and hematocrit is 31.7 %. I have told patient that therapeutic intervention will be indicated when her hemoglobin declines below 10 g/dl and hematocrit is below 30%. She will receive 60,000 units of Procrit subcutaneous injection once every 2 weeks or once a month. At this time, she will continue  taking ferrous sulfate once a day. Since her hemoglobin and hematocrit are now below 10 and 30 respectively we will schedule her to begin the Procrit as outlined. I will see her for follow up in clinic in 3 months. Chronic leukopenia: I have explained to patient that her WBC is stable today at 4.1. I will monitor this however if her WBC declines to 1.0 and absolute neutrophil also declines to 0.5,  she will need intervention in the form of Neupogen or Neulasta. No intervention is warranted at this time. Thrombocytopenia : I have explained to patient that her Platelet today is normal at 129,000. She denies bleeding or bruising. Previous bone marrow biopsy did not show any evidence of lymphropliferative disorder. I will continue to monitor the Platelet counts on a regular basis. Therapeutic intervention would be recommended if the platelet count should decline below 30,000.         I will order her Ferritin, iron profile and compehrensive metabolic panel today and will see patient back for reassessment in 3 months.       Orders Placed This Encounter    COMPLETE CBC & AUTO DIFF WBC    InHouse CBC ([employer])     Standing Status:   Future     Number of Occurrences:   1     Standing Expiration Date:   9/38/2960    METABOLIC PANEL, COMPREHENSIVE     Standing Status:   Future     Standing Expiration Date:   7/10/2019    IRON PROFILE     Standing Status:   Future     Standing Expiration Date:   7/10/2019    FERRITIN     Standing Status:   Future     Standing Expiration Date:   7/10/2019       Rajeev Marroquin MD

## 2018-07-09 NOTE — PATIENT INSTRUCTIONS
Anemia From Chronic Disease: Care Instructions  Your Care Instructions    Anemia is a low level of red blood cells. Red blood cells carry oxygen from your lungs to the rest of your body. Sometimes when you have a long-term (chronic) disease, such as kidney disease, arthritis, diabetes, cancer, or an infection, your body does not make enough red blood cells. Follow-up care is a key part of your treatment and safety. Be sure to make and go to all appointments, and call your doctor if you are having problems. It's also a good idea to know your test results and keep a list of the medicines you take. How can you care for yourself at home? · Follow your doctor's instructions to treat the chronic condition that is causing the anemia. · Be safe with medicines. Take your medicine to treat your chronic condition exactly as prescribed. Call your doctor if you think you are having a problem with your medicine. · Take your medicine for anemia exactly as prescribed. Call your doctor if you think you are having a problem with your medicine. Medicines to increase the number of red blood cells (such as epoetin or darbepoetin) may be given as an injection. ¨ If you miss a dose, take it as soon as you can, unless it is almost time for your next dose. In that case, get back on your regular schedule and take only one dose. ¨ Do not freeze this medicine. Store it in the refrigerator. Do not shake the bottle before you prepare the shot. · Keep all your appointments for blood tests to check on your hemoglobin levels. When should you call for help? Call 911 anytime you think you may need emergency care. For example, call if:  ? · You passed out (lost consciousness). ?Call your doctor now or seek immediate medical care if:  ? · You are short of breath. ? · You are dizzy or light-headed, or you feel like you may faint. ? · You have new or worse bleeding. ? Watch closely for changes in your health, and be sure to contact your doctor if:  ? · You feel weaker or more tired than usual.   ? · You do not get better as expected. Where can you learn more? Go to http://nichol-john.info/. Enter E502 in the search box to learn more about \"Anemia From Chronic Disease: Care Instructions. \"  Current as of: October 13, 2016  Content Version: 11.4  © 1388-7551 [employer]. Care instructions adapted under license by [email or web address] (which disclaims liability or warranty for this information). If you have questions about a medical condition or this instruction, always ask your healthcare professional. [place] [address] any warranty or liability for your use of this information.

## 2018-07-09 NOTE — MR AVS SNAPSHOT
[address] 
[phone] Patient: William Coffman MRN: [id number] KAYE:[id number] Visit Information Date & Time Provider Department Dept. Phone Encounter #  
 7/9/2018  9:15 AM Carmella Fermin MD [email or web address] [phone] [id number] Follow-up Instructions Return in about 3 months (around 10/9/2018). Your Appointments 10/8/2018 10:00 AM  
Office Visit with Carmella Fermin MD  
[email or web address]) Appt Note: 3 MO RET  
 [address] [address] [address] [facility] [address] [facility], [address] [place] [address] Upcoming Health Maintenance Date Due DTaP/Tdap/Td series (1 - Tdap) 7/16/1962 ZOSTER VACCINE AGE 60> 5/16/2001 GLAUCOMA SCREENING Q2Y 7/16/2006 Bone Densitometry (Dexa) Screening 7/16/2006 Pneumococcal 65+ High/Highest Risk (1 of 2 - PCV13) 7/16/2006 MEDICARE YEARLY EXAM 3/14/2018 Influenza Age 5 to Adult 8/1/2018 Allergies as of 7/9/2018  Review Complete On: 7/9/2018 By: Carmella Fermin MD  
  
 Severity Noted Reaction Type Reactions Morphine    Unable to Obtain Neurontin [Gabapentin]  03/17/2015    Unknown (comments) Pcn [Penicillins]    Unable to Obtain Current Immunizations  Never Reviewed No immunizations on file. Not reviewed this visit You Were Diagnosed With   
  
 Codes Comments Myelodysplastic syndrome ([facility] [address]    -  Primary ICD-10-CM: D46.9 ICD-9-CM: 238.75 Myeloproliferative disorder ([facility] [address]     ICD-10-CM: D47.1 ICD-9-CM: 238.79 Anemia, chronic renal failure, stage 3 (moderate)     ICD-10-CM: N18.3, D63.1 ICD-9-CM: 285.21, 585.3 Chronic leukopenia     ICD-10-CM: D72.819 ICD-9-CM: 288.50 Poor appetite     ICD-10-CM: R63.0 ICD-9-CM: 783.0 Thrombocytopenia ([facility]     ICD-10-CM: D69.6 ICD-9-CM: 287.5 Vitals Smoking Status Never Smoker Your Updated Medication List  
  
   
This list is accurate as of 7/9/18  9:56 AM.  Always use your most recent med list.  
  
  
  
  
 ELIQUIS 2.5 mg tablet Generic drug:  apixaban Take 2.5 mg by mouth two (2) times a day. hydroCHLOROthiazide 25 mg tablet Commonly known as:  HYDRODIURIL Take 25 mg by mouth daily. lisinopril 40 mg tablet Commonly known as:  Mollie Ripa Take 40 mg by mouth daily. NIFEdipine ER 90 mg ER tablet Commonly known as:  PROCARDIA XL Take 90 mg by mouth daily. ZOCOR 40 mg tablet Generic drug:  simvastatin Take  by mouth nightly. We Performed the Following COMPLETE CBC & AUTO DIFF WBC [85471 CPT(R)] Follow-up Instructions Return in about 3 months (around 10/9/2018). To-Do List   
 07/09/2018 Lab:  CBC WITH 3 PART DIFF Patient Instructions Anemia From Chronic Disease: Care Instructions Your Care Instructions Anemia is a low level of red blood cells. Red blood cells carry oxygen from your lungs to the rest of your body. Sometimes when you have a long-term (chronic) disease, such as kidney disease, arthritis, diabetes, cancer, or an infection, your body does not make enough red blood cells. Follow-up care is a key part of your treatment and safety. Be sure to make and go to all appointments, and call your doctor if you are having problems. It's also a good idea to know your test results and keep a list of the medicines you take. How can you care for yourself at home? · Follow your doctor's instructions to treat the chronic condition that is causing the anemia. · Be safe with medicines. Take your medicine to treat your chronic condition exactly as prescribed. Call your doctor if you think you are having a problem with your medicine. · Take your medicine for anemia exactly as prescribed. Call your doctor if you think you are having a problem with your medicine. Medicines to increase the number of red blood cells (such as epoetin or darbepoetin) may be given as an injection. ¨ If you miss a dose, take it as soon as you can, unless it is almost time for your next dose. In that case, get back on your regular schedule and take only one dose. ¨ Do not freeze this medicine. Store it in the refrigerator. Do not shake the bottle before you prepare the shot. · Keep all your appointments for blood tests to check on your hemoglobin levels. When should you call for help? Call 911 anytime you think you may need emergency care. For example, call if: 
? · You passed out (lost consciousness). ?Call your doctor now or seek immediate medical care if: 
? · You are short of breath. ? · You are dizzy or light-headed, or you feel like you may faint. ? · You have new or worse bleeding. ? Watch closely for changes in your health, and be sure to contact your doctor if: 
? · You feel weaker or more tired than usual.  
? · You do not get better as expected. Where can you learn more? Go to http://nichol-john.info/. Enter E502 in the search box to learn more about \"Anemia From Chronic Disease: Care Instructions. \" Current as of: October 13, 2016 Content Version: 11.4 © 7524-6699 [employer]. Care instructions adapted under license by [employer] (which disclaims liability or warranty for this information). If you have questions about a medical condition or this instruction, always ask your healthcare professional. [address] any warranty or liability for your use of this information. Introducing [facility]! Hamzah Mcintosh introduces [employer] patient portal. Now you can access parts of your medical record, email your doctor's office, and request medication refills online. 1. In your internet browser, go to [email or web address] 2. Click on the First Time User? Click Here link in the Sign In box. You will see the New Member Sign Up page. 3. Enter your Neurolink Access Code exactly as it appears below. You will not need to use this code after youve completed the sign-up process. If you do not sign up before the expiration date, you must request a new code. · Neurolink Access Code: [email or web address] Expires: 10/7/2018  9:56 AM 
 
4. Enter the last four digits of your Social Security Number (xxxx) and Date of Birth (mm/dd/yyyy) as indicated and click Submit. You will be taken to the next sign-up page. 5. Create a Business e via Italyt ID. This will be your Neurolink login ID and cannot be changed, so think of one that is secure and easy to remember. 6. Create a Neurolink password. You can change your password at any time. 7. Enter your Password Reset Question and Answer. This can be used at a later time if you forget your password. 8. Enter your e-mail address. You will receive e-mail notification when new information is available in [address] 9. Click Sign Up. You can now view and download portions of your medical record. 10. Click the Download Summary menu link to download a portable copy of your medical information. If you have questions, please visit the Frequently Asked Questions section of the Neurolink website. Remember, Neurolink is NOT to be used for urgent needs. For medical emergencies, dial 911. Now available from your iPhone and Android! Please provide this summary of care documentation to your next provider. Your primary care clinician is listed as Marialuisa Levy. If you have any questions after today's visit, please call [phone].

## 2018-10-08 ENCOUNTER — OFFICE VISIT (OUTPATIENT)
Dept: ONCOLOGY | Age: 78
End: 2018-10-08

## 2018-10-08 ENCOUNTER — HOSPITAL ENCOUNTER (OUTPATIENT)
Dept: LAB | Age: 78
Discharge: HOME | End: 2018-10-08
Payer: MEDICARE

## 2018-10-08 ENCOUNTER — HOSPITAL ENCOUNTER (OUTPATIENT)
Dept: ONCOLOGY | Age: 78
Discharge: HOME | End: 2018-10-08

## 2018-10-08 VITALS
HEART RATE: 69 BPM | WEIGHT: 127 LBS | DIASTOLIC BLOOD PRESSURE: 76 MMHG | SYSTOLIC BLOOD PRESSURE: 124 MMHG | BODY MASS INDEX: 21.68 KG/M2 | TEMPERATURE: 98 F | HEIGHT: 64 IN

## 2018-10-08 DIAGNOSIS — D63.1 ANEMIA, CHRONIC RENAL FAILURE, STAGE 3 (MODERATE) (HCC): ICD-10-CM

## 2018-10-08 DIAGNOSIS — D47.1 MYELOPROLIFERATIVE DISORDER (HCC): ICD-10-CM

## 2018-10-08 DIAGNOSIS — D46.9 MYELODYSPLASTIC SYNDROME (HCC): Primary | ICD-10-CM

## 2018-10-08 DIAGNOSIS — D72.819 CHRONIC LEUKOPENIA: ICD-10-CM

## 2018-10-08 DIAGNOSIS — N18.30 ANEMIA, CHRONIC RENAL FAILURE, STAGE 3 (MODERATE) (HCC): ICD-10-CM

## 2018-10-08 DIAGNOSIS — D46.9 MYELODYSPLASTIC SYNDROME (HCC): ICD-10-CM

## 2018-10-08 LAB
ALBUMIN SERPL-MCNC: 4.1 G/DL (ref 3.4–5)
ALBUMIN/GLOB SERPL: 1.3 {RATIO} (ref 0.8–1.7)
ALP SERPL-CCNC: 88 U/L (ref 45–117)
ALT SERPL-CCNC: 33 U/L (ref 13–56)
ANION GAP SERPL CALC-SCNC: 8 MMOL/L (ref 3–18)
AST SERPL-CCNC: 18 U/L (ref 15–37)
BASO+EOS+MONOS # BLD AUTO: 0.2 K/UL (ref 0–2.3)
BASO+EOS+MONOS # BLD AUTO: 5 % (ref 0.1–17)
BILIRUB SERPL-MCNC: 0.6 MG/DL (ref 0.2–1)
BUN SERPL-MCNC: 32 MG/DL (ref 7–18)
BUN/CREAT SERPL: 17 (ref 12–20)
CALCIUM SERPL-MCNC: 8.9 MG/DL (ref 8.5–10.1)
CHLORIDE SERPL-SCNC: 111 MMOL/L (ref 100–108)
CO2 SERPL-SCNC: 24 MMOL/L (ref 21–32)
CREAT SERPL-MCNC: 1.88 MG/DL (ref 0.6–1.3)
DIFFERENTIAL METHOD BLD: ABNORMAL
ERYTHROCYTE [DISTWIDTH] IN BLOOD BY AUTOMATED COUNT: 12.4 % (ref 11.5–14.5)
FERRITIN SERPL-MCNC: 93 NG/ML (ref 8–388)
GLOBULIN SER CALC-MCNC: 3.2 G/DL (ref 2–4)
GLUCOSE SERPL-MCNC: 116 MG/DL (ref 74–99)
HCT VFR BLD AUTO: 31.7 % (ref 36–48)
HGB BLD-MCNC: 10.6 G/DL (ref 12–16)
IRON SATN MFR SERPL: 31 %
IRON SERPL-MCNC: 88 UG/DL (ref 50–175)
LYMPHOCYTES # BLD: 1.3 K/UL (ref 1.1–5.9)
LYMPHOCYTES NFR BLD: 32 % (ref 14–44)
MCH RBC QN AUTO: 31.4 PG (ref 25–35)
MCHC RBC AUTO-ENTMCNC: 33.4 G/DL (ref 31–37)
MCV RBC AUTO: 93.8 FL (ref 78–102)
NEUTS SEG # BLD: 2.6 K/UL (ref 1.8–9.5)
NEUTS SEG NFR BLD: 63 % (ref 40–70)
PLATELET # BLD AUTO: 135 K/UL (ref 140–440)
POTASSIUM SERPL-SCNC: 5.3 MMOL/L (ref 3.5–5.5)
PROT SERPL-MCNC: 7.3 G/DL (ref 6.4–8.2)
RBC # BLD AUTO: 3.38 M/UL (ref 4.1–5.1)
SODIUM SERPL-SCNC: 143 MMOL/L (ref 136–145)
TIBC SERPL-MCNC: 284 UG/DL (ref 250–450)
WBC # BLD AUTO: 4.1 K/UL (ref 4.5–13)

## 2018-10-08 PROCEDURE — 80053 COMPREHEN METABOLIC PANEL: CPT | Performed by: INTERNAL MEDICINE

## 2018-10-08 PROCEDURE — 83540 ASSAY OF IRON: CPT | Performed by: INTERNAL MEDICINE

## 2018-10-08 PROCEDURE — 82728 ASSAY OF FERRITIN: CPT | Performed by: INTERNAL MEDICINE

## 2018-10-08 NOTE — PATIENT INSTRUCTIONS
Anemia From Chronic Disease: Care Instructions Your Care Instructions Anemia is a low level of red blood cells. Red blood cells carry oxygen from your lungs to the rest of your body. Sometimes when you have a long-term (chronic) disease, such as kidney disease, arthritis, diabetes, cancer, or an infection, your body does not make enough red blood cells. Follow-up care is a key part of your treatment and safety. Be sure to make and go to all appointments, and call your doctor if you are having problems. It's also a good idea to know your test results and keep a list of the medicines you take. How can you care for yourself at home? · Follow your doctor's instructions to treat the chronic condition that is causing the anemia. · Be safe with medicines. Take your medicine to treat your chronic condition exactly as prescribed. Call your doctor if you think you are having a problem with your medicine. · Take your medicine for anemia exactly as prescribed. Call your doctor if you think you are having a problem with your medicine. Medicines to increase the number of red blood cells (such as epoetin or darbepoetin) may be given as an injection. ¨ If you miss a dose, take it as soon as you can, unless it is almost time for your next dose. In that case, get back on your regular schedule and take only one dose. ¨ Do not freeze this medicine. Store it in the refrigerator. Do not shake the bottle before you prepare the shot. · Keep all your appointments for blood tests to check on your hemoglobin levels. When should you call for help? Call 911 anytime you think you may need emergency care. For example, call if: 
  · You passed out (lost consciousness).  
 Call your doctor now or seek immediate medical care if: 
  · You are short of breath.  
  · You are dizzy or light-headed, or you feel like you may faint.  
  · You have new or worse bleeding.  Watch closely for changes in your health, and be sure to contact your doctor if: 
  · You feel weaker or more tired than usual.  
  · You do not get better as expected. Where can you learn more? Go to http://nichol-john.info/. Enter E502 in the search box to learn more about \"Anemia From Chronic Disease: Care Instructions. \" Current as of: May 7, 2018 Content Version: 11.8 © 8877-0718 [employer]. Care instructions adapted under license by [employer] (which disclaims liability or warranty for this information). If you have questions about a medical condition or this instruction, always ask your healthcare professional. [address] any warranty or liability for your use of this information.

## 2018-10-08 NOTE — PROGRESS NOTES
Hematology/Oncology  Progress Note Name: Duarte Jacobs Date: 10/8/2019 : 1940 Olga Farrell MD  
 
Ms. Justin Katz is a 69 y.o.  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 Current therapy: Procrit,  dose of 60,000 units whenever the hematocrit is below 30% and supportive care. Subjective:  
 
Ms. Justin Katz is a 71-year-old woman who has a long-standing history of chronic kidney disease with associated anemia. She also has myelodysplastic syndrome. The patient has no complaint of weakness or fatigue. She also denies bleeding, blood loss or any bruising. She reports her appetite has improved greatly, she is continuing to slowly gain weight. She states that she is doing well and feels fine at present. She is s/p right knee replacement and is continuing to use a cane for mobility support. Today, she has no additional complaints or concerns to report. Past medical history, family history, and social history: these were reviewed and remains unchanged. Past Medical History:  
Diagnosis Date  Arthritis  Asthma  Breathing problem  Diabetes ([address]  Hypercholesterolemia  Hypertension  Iron deficiency anemia  Pacemaker  Ulcer Past Surgical History:  
Procedure Laterality Date  CARDIAC SURG PROCEDURE UNLIST    
 pacemaker  ENDOSCOPY, COLON, DIAGNOSTIC    
 HX HERNIA REPAIR    
 HX ORTHOPAEDIC    
 knee replacement Social History Social History  Marital status:  Spouse name: N/A  
 Number of children: N/A  
 Years of education: N/A Occupational History  Not on file. Social History Main Topics  Smoking status: Never Smoker  Smokeless tobacco: Never Used  Alcohol use No  
 Drug use: No  
 Sexual activity: Not on file Other Topics Concern  Not on file Social History Narrative Family History Problem Relation Age of Onset  Hypertension Other  Heart Disease Neg Hx  Cancer Neg Hx  Diabetes Neg Hx  Stroke Neg Hx Current Outpatient Prescriptions Medication Sig Dispense Refill  simvastatin (ZOCOR) 40 mg tablet Take  by mouth nightly.  apixaban (ELIQUIS) 2.5 mg tablet Take 2.5 mg by mouth two (2) times a day.  lisinopril (PRINIVIL, ZESTRIL) 40 mg tablet Take 40 mg by mouth daily.  NIFEdipine ER (PROCARDIA XL) 90 mg ER tablet Take 90 mg by mouth daily.  hydrochlorothiazide (HYDRODIURIL) 25 mg tablet Take 25 mg by mouth daily. Review of Systems Constitutional: The patient has no acute distress or discomfort. HEENT: The patient denies recent head trauma, eye pain, blurred vision,  hearing deficit, oropharyngeal mucosal pain or lesions, and the patient denies throat pain or discomfort. Lymphatics: The patient denies palpable peripheral lymphadenopathy. Hematologic: The patient denies having bruising, bleeding, or progressive fatigue. Respiratory: Patient denies having shortness of breath, cough, sputum production, fever, or dyspnea on exertion. Cardiovascular: The patient denies having leg pain, leg swelling, heart palpitations, chest permit, chest pain, or lightheadedness. The patient denies having dyspnea on exertion. Gastrointestinal: The patient denies having nausea, emesis, or diarrhea. The patient denies having any hematemesis or blood in the stool. Genitourinary: Patient denies having urinary urgency, frequency, or dysuria. The patient denies having blood in the urine. Psychological: The patient denies having symptoms of nervousness, anxiety, depression, or thoughts of harming himself some of this. Skin: Patient denies having skin rashes, skin, ulcerations, or unexplained itching or pruritus. Musculoskeletal: The patient denies having pain in the joints or bones. S/p right knee replacement. .using a cane for mobility support. Objective:  
 
Visit Vitals  /76  Pulse 69  Temp 98 °F (36.7 °C)  Ht 5' 4\" (1.626 m)  Wt 57.6 kg (127 lb)  BMI 21.8 kg/m2 ECOG 1 PS 0/10 Physical Exam:  
Gen. Appearance: The patient is in no acute distress. Skin: There is no bruise or rash. HEENT: The exam is unremarkable. Neck: Supple without lymphadenopathy or thyromegaly. Lungs: Clear to auscultation and percussion; there are no wheezes or rhonchi. Heart: Regular rate and rhythm; there are no murmurs, gallops, or rubs. Abdomen: Bowel sounds are present and normal.  There is no guarding, tenderness, or hepatosplenomegaly. Extremities: There is no clubbing, cyanosis, or edema. Neurologic: There are no focal neurologic deficits. Lymphatics: There is no palpable peripheral lymphadenopathy. Musculoskeletal: The patient has full range of motion at all joints. There is no evidence of joint deformity or effusions. There is no focal joint tenderness. Psychological/psychiatric: There is no clinical evidence of anxiety, depression, or melancholy. Lab data: 
   
Results for orders placed or performed during the hospital encounter of 10/08/18 CBC WITH 3 PART DIFF     Status: Abnormal  
Result Value Ref Range Status WBC 4.1 (L) 4.5 - 13.0 K/uL Final  
 RBC 3.38 (L) 4.10 - 5.10 M/uL Final  
 HGB 10.6 (L) 12.0 - 16 g/dL Final  
 HCT 31.7 (L) 36 - 48 % Final  
 MCV 93.8 78 - 102 FL Final  
 MCH 31.4 25.0 - 35.0 PG Final  
 MCHC 33.4 31 - 37 g/dL Final  
 RDW 12.4 11.5 - 14.5 % Final  
 PLATELET 632 (L) 940 - 440 K/uL Final  
 NEUTROPHILS 63 40 - 70 % Final  
 MIXED CELLS 5 0.1 - 17 % Final  
 LYMPHOCYTES 32 14 - 44 % Final  
 ABS. NEUTROPHILS 2.6 1.8 - 9.5 K/UL Final  
 ABS. MIXED CELLS 0.2 0.0 - 2.3 K/uL Final  
 ABS. LYMPHOCYTES 1.3 1.1 - 5.9 K/UL Final  
  Comment: Test performed at [address]. Results Reviewed by Medical Director. DF AUTOMATED   Final  
 
 
   
Assessment: 1. Myelodysplastic syndrome ([facility] [address] 2. Myeloproliferative disorder ([facility] [address] 3. Anemia, chronic renal failure, stage 3 (moderate) (HCC) 4. Chronic leukopenia Plan: Myelodysplastic syndrome/myeloproliferative disorder: I have explained to the patient that she has this chronic bone marrow disorder, which is contributing to her anemia, as well as her chronic kidney disease. The leukopenia is relatively stable at this time with a WBC count of 4.1 and the platelet count is currently 135,000. This will continue to be monitored every 3 months for any evidence of progression. 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 I have explained to patient that her CBC today shows a WBC of 4.1, hemoglobin is 10.6 g/dL and hematocrit is 31.7 %. I have told patient that therapeutic intervention will be indicated when her hemoglobin declines below 10 g/dl and hematocrit is below 30%. She will receive 60,000 units of Procrit subcutaneous injection once every 2 weeks or once a month. At this time, she will continue  taking ferrous sulfate once a day. I will see her for follow up in clinic in 3 months. Chronic leukopenia: I have explained to patient that her WBC is stable today at 4.1. I will monitor this however if her WBC declines to 1.0 and absolute neutrophil also declines to 0.5,  she will need intervention in the form of Neupogen or Neulasta. No intervention is warranted at this time. Thrombocytopenia : I have explained to patient that her Platelet today is normal at 135,000. She denies bleeding or bruising. Previous bone marrow biopsy did not show any evidence of lymphropliferative disorder. I will continue to monitor the Platelet counts on a regular basis. Therapeutic intervention would be recommended if the platelet count should decline below 30,000. I will order her Ferritin, iron profile and compehrensive metabolic panel today and will see patient back for reassessment in 3 months. Orders Placed This Encounter  COMPLETE CBC & AUTO DIFF WBC  InHouse CBC ([facility]) Standing Status:   Future Number of Occurrences:   1 Standing Expiration Date:   10/15/2018  METABOLIC PANEL, COMPREHENSIVE Standing Status:   Future Standing Expiration Date:   10/9/2019  
 IRON PROFILE Standing Status:   Future Standing Expiration Date:   10/9/2019  FERRITIN Standing Status:   Future Standing Expiration Date:   10/9/2019 Tabitha Stoddard MD 
10/8/2018

## 2018-10-08 NOTE — MR AVS SNAPSHOT
[address] [facility], [place], operated by [facility] 
 
 
 [address] 
[phone] Patient: Nehal Ochoa MRN: [id number] KFE:[id number] Visit Information Date & Time Provider Department Dept. Phone Encounter #  
 10/8/2018 10:00 AM Brandon Bowens MD [facility] Oncology [phone] [id number] Follow-up Instructions Return in about 3 months (around 1/8/2019). Your Appointments 1/14/2019 10:30 AM  
Office Visit with Brandon Bowens MD  
[address]  Oncology [address] [facility]) Appt Note: 4 MO RET  
 [address] [id number] [facility], [address] [place] [address] Upcoming Health Maintenance Date Due DTaP/Tdap/Td series (1 - Tdap) 7/16/1962 Shingrix Vaccine Age 50> (1 of 2) 7/16/1991 GLAUCOMA SCREENING Q2Y 7/16/2006 Bone Densitometry (Dexa) Screening 7/16/2006 Pneumococcal 65+ High/Highest Risk (1 of 2 - PCV13) 7/16/2006 MEDICARE YEARLY EXAM 3/14/2018 Influenza Age 5 to Adult 8/1/2018 Allergies as of 10/8/2018  Review Complete On: 10/8/2018 By: Brandon Bowens MD  
  
 Severity Noted Reaction Type Reactions Morphine    Unable to Obtain Neurontin [Gabapentin]  03/17/2015    Unknown (comments) Pcn [Penicillins]    Unable to Obtain Current Immunizations  Never Reviewed No immunizations on file. Not reviewed this visit You Were Diagnosed With   
  
 Codes Comments Myelodysplastic syndrome ([facility] [address]    -  Primary ICD-10-CM: D46.9 ICD-9-CM: 238.75 Myeloproliferative disorder ([facility] [address]     ICD-10-CM: D47.1 ICD-9-CM: 238.79 Anemia, chronic renal failure, stage 3 (moderate) (HCC)     ICD-10-CM: N18.3, D63.1 ICD-9-CM: 285.21, 585.3 Chronic leukopenia     ICD-10-CM: D72.819 ICD-9-CM: 288.50 Vitals BP Pulse Temp Height(growth percentile) Weight(growth percentile) BMI  
 124/76 69 98 °F (36.7 °C) 5' 4\" (1.626 m) 127 lb (57.6 kg) 21.8 kg/m2 Smoking Status Never Smoker BMI and BSA Data Body Mass Index Body Surface Area  
 21.8 kg/m 2 1.61 m 2 Your Updated Medication List  
  
   
This list is accurate as of 10/8/18 10:46 AM.  Always use your most recent med list.  
  
  
  
  
 ELIQUIS 2.5 mg tablet Generic drug:  apixaban Take 2.5 mg by mouth two (2) times a day. hydroCHLOROthiazide 25 mg tablet Commonly known as:  HYDRODIURIL Take 25 mg by mouth daily. lisinopril 40 mg tablet Commonly known as:  [place] Take 40 mg by mouth daily. NIFEdipine ER 90 mg ER tablet Commonly known as:  PROCARDIA XL Take 90 mg by mouth daily. ZOCOR 40 mg tablet Generic drug:  simvastatin Take  by mouth nightly. We Performed the Following COMPLETE CBC & AUTO DIFF WBC [96978 CPT(R)] Follow-up Instructions Return in about 3 months (around 1/8/2019). To-Do List   
 10/08/2018 Lab:  CBC WITH 3 PART DIFF   
  
 10/08/2018 Lab:  FERRITIN   
  
 10/08/2018 Lab:  IRON PROFILE   
  
 10/08/2018 Lab:  METABOLIC PANEL, COMPREHENSIVE Patient Instructions Anemia From Chronic Disease: Care Instructions Your Care Instructions Anemia is a low level of red blood cells. Red blood cells carry oxygen from your lungs to the rest of your body. Sometimes when you have a long-term (chronic) disease, such as kidney disease, arthritis, diabetes, cancer, or an infection, your body does not make enough red blood cells. Follow-up care is a key part of your treatment and safety. Be sure to make and go to all appointments, and call your doctor if you are having problems. It's also a good idea to know your test results and keep a list of the medicines you take. How can you care for yourself at home? · Follow your doctor's instructions to treat the chronic condition that is causing the anemia. · Be safe with medicines. Take your medicine to treat your chronic condition exactly as prescribed. Call your doctor if you think you are having a problem with your medicine. · Take your medicine for anemia exactly as prescribed. Call your doctor if you think you are having a problem with your medicine. Medicines to increase the number of red blood cells (such as epoetin or darbepoetin) may be given as an injection. ¨ If you miss a dose, take it as soon as you can, unless it is almost time for your next dose. In that case, get back on your regular schedule and take only one dose. ¨ Do not freeze this medicine. Store it in the refrigerator. Do not shake the bottle before you prepare the shot. · Keep all your appointments for blood tests to check on your hemoglobin levels. When should you call for help? Call 911 anytime you think you may need emergency care. For example, call if: 
  · You passed out (lost consciousness).  
 Call your doctor now or seek immediate medical care if: 
  · You are short of breath.  
  · You are dizzy or light-headed, or you feel like you may faint.  
  · You have new or worse bleeding.  
 Watch closely for changes in your health, and be sure to contact your doctor if: 
  · You feel weaker or more tired than usual.  
  · You do not get better as expected. Where can you learn more? Go to http://nichol-john.info/. Enter E502 in the search box to learn more about \"Anemia From Chronic Disease: Care Instructions. \" Current as of: May 7, 2018 Content Version: 11.8 © 5652-7610 [employer]. Care instructions adapted under license by [employer] (which disclaims liability or warranty for this information).  If you have questions about a medical condition or this instruction, always ask your healthcare professional. [employer] disclaims any warranty or liability for your use of this information. Introducing [facility] & HEALTH SERVICES! [facility] introduces [facility] patient portal. Now you can access parts of your medical record, email your doctor's office, and request medication refills online. 1. In your internet browser, go to [email or web address] 2. Click on the First Time User? Click Here link in the Sign In box. You will see the New Member Sign Up page. 3. Enter your [facility] Access Code exactly as it appears below. You will not need to use this code after youve completed the sign-up process. If you do not sign up before the expiration date, you must request a new code. · [facility] Access Code: [email or web address] Expires: 1/6/2019 10:46 AM 
 
4. Enter the last four digits of your Social Security Number (xxxx) and Date of Birth (mm/dd/yyyy) as indicated and click Submit. You will be taken to the next sign-up page. 5. Create a [facility] ID. This will be your [facility] login ID and cannot be changed, so think of one that is secure and easy to remember. 6. Create a [facility] password. You can change your password at any time. 7. Enter your Password Reset Question and Answer. This can be used at a later time if you forget your password. 8. Enter your e-mail address. You will receive e-mail notification when new information is available in [address] 9. Click Sign Up. You can now view and download portions of your medical record. 10. Click the Download Summary menu link to download a portable copy of your medical information. If you have questions, please visit the Frequently Asked Questions section of the [facility] website. Remember, [facility] is NOT to be used for urgent needs. For medical emergencies, dial 911. Now available from your iPhone and Android! Please provide this summary of care documentation to your next provider. Your primary care clinician is listed as Rebecca Martinez. If you have any questions after today's visit, please call [phone].

## 2019-01-01 ENCOUNTER — HOSPITAL ENCOUNTER (OUTPATIENT)
Dept: LAB | Age: 79
Discharge: HOME | End: 2019-03-20
Payer: MEDICARE

## 2019-01-01 ENCOUNTER — HOSPITAL ENCOUNTER (OUTPATIENT)
Dept: LAB | Age: 79
Discharge: HOME | End: 2019-07-24
Payer: MEDICARE

## 2019-01-01 ENCOUNTER — HOSPITAL ENCOUNTER (OUTPATIENT)
Dept: ONCOLOGY | Age: 79
Discharge: HOME | End: 2019-01-14

## 2019-01-01 ENCOUNTER — OFFICE VISIT (OUTPATIENT)
Dept: ONCOLOGY | Age: 79
End: 2019-01-01

## 2019-01-01 ENCOUNTER — HOSPITAL ENCOUNTER (OUTPATIENT)
Dept: ONCOLOGY | Age: 79
Discharge: HOME | End: 2019-07-24

## 2019-01-01 ENCOUNTER — HOSPITAL ENCOUNTER (OUTPATIENT)
Dept: ONCOLOGY | Age: 79
Discharge: HOME | End: 2019-03-20

## 2019-01-01 VITALS
HEART RATE: 79 BPM | OXYGEN SATURATION: 98 % | DIASTOLIC BLOOD PRESSURE: 91 MMHG | BODY MASS INDEX: 22.88 KG/M2 | WEIGHT: 134 LBS | TEMPERATURE: 97.3 F | RESPIRATION RATE: 18 BRPM | SYSTOLIC BLOOD PRESSURE: 162 MMHG | HEIGHT: 64 IN

## 2019-01-01 VITALS
OXYGEN SATURATION: 100 % | SYSTOLIC BLOOD PRESSURE: 155 MMHG | RESPIRATION RATE: 18 BRPM | TEMPERATURE: 97 F | WEIGHT: 135 LBS | BODY MASS INDEX: 23.05 KG/M2 | HEIGHT: 64 IN | DIASTOLIC BLOOD PRESSURE: 101 MMHG | HEART RATE: 91 BPM

## 2019-01-01 DIAGNOSIS — D69.6 THROMBOCYTOPENIA (HCC): ICD-10-CM

## 2019-01-01 DIAGNOSIS — R63.0 POOR APPETITE: ICD-10-CM

## 2019-01-01 DIAGNOSIS — N18.30 ANEMIA, CHRONIC RENAL FAILURE, STAGE 3 (MODERATE) (HCC): ICD-10-CM

## 2019-01-01 DIAGNOSIS — D46.9 MYELODYSPLASTIC SYNDROME (HCC): ICD-10-CM

## 2019-01-01 DIAGNOSIS — D46.9 MYELODYSPLASTIC SYNDROME (HCC): Primary | ICD-10-CM

## 2019-01-01 DIAGNOSIS — D63.1 ANEMIA, CHRONIC RENAL FAILURE, STAGE 3 (MODERATE) (HCC): ICD-10-CM

## 2019-01-01 DIAGNOSIS — D72.819 CHRONIC LEUKOPENIA: ICD-10-CM

## 2019-01-01 LAB
ALBUMIN SERPL-MCNC: 3.7 G/DL (ref 3.4–5)
ALBUMIN SERPL-MCNC: 4 G/DL (ref 3.4–5)
ALBUMIN/GLOB SERPL: 1.2 {RATIO} (ref 0.8–1.7)
ALBUMIN/GLOB SERPL: 1.5 {RATIO} (ref 0.8–1.7)
ALP SERPL-CCNC: 84 U/L (ref 45–117)
ALP SERPL-CCNC: 94 U/L (ref 45–117)
ALT SERPL-CCNC: 26 U/L (ref 13–56)
ALT SERPL-CCNC: 26 U/L (ref 13–56)
ANION GAP SERPL CALC-SCNC: 10 MMOL/L (ref 3–18)
ANION GAP SERPL CALC-SCNC: 3 MMOL/L (ref 3–18)
AST SERPL-CCNC: 18 U/L (ref 15–37)
AST SERPL-CCNC: 23 U/L (ref 10–38)
BASO+EOS+MONOS # BLD AUTO: 0.2 K/UL (ref 0–2.3)
BASO+EOS+MONOS # BLD AUTO: 0.3 K/UL (ref 0–2.3)
BASO+EOS+MONOS NFR BLD AUTO: 4 % (ref 0.1–17)
BASO+EOS+MONOS NFR BLD AUTO: 6 % (ref 0.1–17)
BILIRUB SERPL-MCNC: 0.4 MG/DL (ref 0.2–1)
BILIRUB SERPL-MCNC: 0.6 MG/DL (ref 0.2–1)
BUN SERPL-MCNC: 17 MG/DL (ref 7–18)
BUN SERPL-MCNC: 25 MG/DL (ref 7–18)
BUN/CREAT SERPL: 10 (ref 12–20)
BUN/CREAT SERPL: 15 (ref 12–20)
CALCIUM SERPL-MCNC: 8.8 MG/DL (ref 8.5–10.1)
CALCIUM SERPL-MCNC: 9.3 MG/DL (ref 8.5–10.1)
CHLORIDE SERPL-SCNC: 106 MMOL/L (ref 100–108)
CHLORIDE SERPL-SCNC: 117 MMOL/L (ref 100–111)
CO2 SERPL-SCNC: 22 MMOL/L (ref 21–32)
CO2 SERPL-SCNC: 31 MMOL/L (ref 21–32)
CREAT SERPL-MCNC: 1.68 MG/DL (ref 0.6–1.3)
CREAT SERPL-MCNC: 1.7 MG/DL (ref 0.6–1.3)
DIFFERENTIAL METHOD BLD: ABNORMAL
DIFFERENTIAL METHOD BLD: ABNORMAL
ERYTHROCYTE [DISTWIDTH] IN BLOOD BY AUTOMATED COUNT: 12 % (ref 11.5–14.5)
ERYTHROCYTE [DISTWIDTH] IN BLOOD BY AUTOMATED COUNT: 12.4 % (ref 11.5–14.5)
FERRITIN SERPL-MCNC: 102 NG/ML (ref 8–388)
FERRITIN SERPL-MCNC: 61 NG/ML (ref 8–388)
GLOBULIN SER CALC-MCNC: 2.7 G/DL (ref 2–4)
GLOBULIN SER CALC-MCNC: 3.2 G/DL (ref 2–4)
GLUCOSE SERPL-MCNC: 116 MG/DL (ref 74–99)
GLUCOSE SERPL-MCNC: 119 MG/DL (ref 74–99)
HCT VFR BLD AUTO: 33 % (ref 36–48)
HCT VFR BLD AUTO: 34.7 % (ref 36–48)
HGB BLD-MCNC: 11.1 G/DL (ref 12–16)
HGB BLD-MCNC: 11.3 G/DL (ref 12–16)
IRON SATN MFR SERPL: 28 %
IRON SATN MFR SERPL: 30 %
IRON SERPL-MCNC: 76 UG/DL (ref 50–175)
IRON SERPL-MCNC: 82 UG/DL (ref 50–175)
LYMPHOCYTES # BLD: 1.3 K/UL (ref 1.1–5.9)
LYMPHOCYTES # BLD: 1.5 K/UL (ref 1.1–5.9)
LYMPHOCYTES NFR BLD: 29 % (ref 14–44)
LYMPHOCYTES NFR BLD: 38 % (ref 14–44)
MCH RBC QN AUTO: 30.7 PG (ref 25–35)
MCH RBC QN AUTO: 31 PG (ref 25–35)
MCHC RBC AUTO-ENTMCNC: 32.6 G/DL (ref 31–37)
MCHC RBC AUTO-ENTMCNC: 33.6 G/DL (ref 31–37)
MCV RBC AUTO: 92.2 FL (ref 78–102)
MCV RBC AUTO: 94.3 FL (ref 78–102)
NEUTS SEG # BLD: 2.3 K/UL (ref 1.8–9.5)
NEUTS SEG # BLD: 3 K/UL (ref 1.8–9.5)
NEUTS SEG NFR BLD: 58 % (ref 40–70)
NEUTS SEG NFR BLD: 66 % (ref 40–70)
PLATELET # BLD AUTO: 125 K/UL (ref 140–440)
PLATELET # BLD AUTO: 138 K/UL (ref 140–440)
POTASSIUM SERPL-SCNC: 4.5 MMOL/L (ref 3.5–5.5)
POTASSIUM SERPL-SCNC: 4.8 MMOL/L (ref 3.5–5.5)
PROT SERPL-MCNC: 6.7 G/DL (ref 6.4–8.2)
PROT SERPL-MCNC: 6.9 G/DL (ref 6.4–8.2)
RBC # BLD AUTO: 3.58 M/UL (ref 4.1–5.1)
RBC # BLD AUTO: 3.68 M/UL (ref 4.1–5.1)
SODIUM SERPL-SCNC: 140 MMOL/L (ref 136–145)
SODIUM SERPL-SCNC: 149 MMOL/L (ref 136–145)
TIBC SERPL-MCNC: 256 UG/DL (ref 250–450)
TIBC SERPL-MCNC: 288 UG/DL (ref 250–450)
WBC # BLD AUTO: 4 K/UL (ref 4.5–13)
WBC # BLD AUTO: 4.6 K/UL (ref 4.5–13)

## 2019-01-01 PROCEDURE — 36415 COLL VENOUS BLD VENIPUNCTURE: CPT

## 2019-01-01 PROCEDURE — 80053 COMPREHEN METABOLIC PANEL: CPT

## 2019-01-01 PROCEDURE — 82728 ASSAY OF FERRITIN: CPT

## 2019-01-01 PROCEDURE — 83540 ASSAY OF IRON: CPT

## 2019-03-20 NOTE — PROGRESS NOTES
Hematology/Oncology  Progress Note    Name: Mateo Ennis  Date: 2019  : 1940    PCP:ANTHONY Tejada MD     Ms. Tayla Archibald is a 69 y.o.  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 Current therapy: Procrit,  dose of 60,000 units whenever the hematocrit is below 30% and supportive care. Subjective:     Ms. Tayla Archibald is a 41-year-old woman who has a long-standing history of chronic kidney disease with associated anemia. She also has myelodysplastic syndrome. The patient has no complaint of weakness or fatigue. She also denies bleeding, blood loss or any bruising. She reports her appetite has improved greatly. She is using a cane for mobility support. Today, she has no additional complaints or concerns to report. she is in the office with her daughter. Past medical history, family history, and social history: these were reviewed and remains unchanged.     Past Medical History:   Diagnosis Date    Arthritis     Asthma     Breathing problem     Diabetes ([address]     Hypercholesterolemia     Hypertension     Iron deficiency anemia     Pacemaker     Ulcer      Past Surgical History:   Procedure Laterality Date    CARDIAC SURG PROCEDURE UNLIST      pacemaker    ENDOSCOPY, COLON, DIAGNOSTIC      HX HERNIA REPAIR      HX ORTHOPAEDIC      knee replacement     Social History     Socioeconomic History    Marital status:      Spouse name: Not on file    Number of children: Not on file    Years of education: Not on file    Highest education level: Not on file   Occupational History    Not on file   Social Needs    Financial resource strain: Not on file    Food insecurity:     Worry: Not on file     Inability: Not on file    Transportation needs:     Medical: Not on file     Non-medical: Not on file   Tobacco Use    Smoking status: Never Smoker    Smokeless tobacco: Never Used   Substance and Sexual Activity    Alcohol use: No    Drug use: No    Sexual activity: Not on file   Lifestyle    Physical activity:     Days per week: Not on file     Minutes per session: Not on file    Stress: Not on file   Relationships    Social connections:     Talks on phone: Not on file     Gets together: Not on file     Attends [employer]ism service: Not on file     Active member of club or organization: Not on file     Attends meetings of clubs or organizations: Not on file     Relationship status: Not on file    Intimate partner violence:     Fear of current or ex partner: Not on file     Emotionally abused: Not on file     Physically abused: Not on file     Forced sexual activity: Not on file   Other Topics Concern    Not on file   Social History Narrative    Not on file     Family History   Problem Relation Age of Onset    Hypertension Other     Heart Disease Neg Hx     Cancer Neg Hx     Diabetes Neg Hx     Stroke Neg Hx      Current Outpatient Medications   Medication Sig Dispense Refill    simvastatin (ZOCOR) 40 mg tablet Take  by mouth nightly.  apixaban (ELIQUIS) 2.5 mg tablet Take 2.5 mg by mouth two (2) times a day.  lisinopril (PRINIVIL, ZESTRIL) 40 mg tablet Take 40 mg by mouth daily.  NIFEdipine ER (PROCARDIA XL) 90 mg ER tablet Take 90 mg by mouth daily.  hydrochlorothiazide (HYDRODIURIL) 25 mg tablet Take 25 mg by mouth daily. Review of Systems  Constitutional: The patient has no acute distress or discomfort. HEENT: The patient denies recent head trauma, eye pain, blurred vision,  hearing deficit, oropharyngeal mucosal pain or lesions, and the patient denies throat pain or discomfort. Lymphatics: The patient denies palpable peripheral lymphadenopathy. Hematologic: The patient denies having bruising, bleeding, or progressive fatigue. Respiratory: Patient denies having shortness of breath, cough, sputum production, fever, or dyspnea on exertion.   Cardiovascular: The patient denies having leg pain, leg swelling, heart palpitations, chest permit, chest pain, or lightheadedness. The patient denies having dyspnea on exertion. Gastrointestinal: The patient denies having nausea, emesis, or diarrhea. The patient denies having any hematemesis or blood in the stool. Genitourinary: Patient denies having urinary urgency, frequency, or dysuria. The patient denies having blood in the urine. Psychological: The patient denies having symptoms of nervousness, anxiety, depression, or thoughts of harming himself some of this. Skin: Patient denies having skin rashes, skin, ulcerations, or unexplained itching or pruritus. Musculoskeletal: The patient denies having pain in the joints or bones. S/p right knee replacement. .using a cane for mobility support. Objective:     Visit Vitals  BP (!) 155/101 Comment: patient has not taken BP meds   Pulse 91   Temp 97 °F (36.1 °C) (Oral)   Resp 18   Ht 5' 4\" (1.626 m)   Wt 61.2 kg (135 lb)   SpO2 100%   BMI 23.17 kg/m²     ECOG 1 PS 0/10  Physical Exam:   Gen. Appearance: The patient is in no acute distress. Skin: There is no bruise or rash. HEENT: The exam is unremarkable. Neck: Supple without lymphadenopathy or thyromegaly. Lungs: Clear to auscultation and percussion; there are no wheezes or rhonchi. Heart: Regular rate and rhythm; there are no murmurs, gallops, or rubs. Abdomen: Bowel sounds are present and normal.  There is no guarding, tenderness, or hepatosplenomegaly. Extremities: There is no clubbing, cyanosis, or edema. Neurologic: There are no focal neurologic deficits. Lymphatics: There is no palpable peripheral lymphadenopathy. Musculoskeletal: The patient has full range of motion at all joints. There is no evidence of joint deformity or effusions. There is no focal joint tenderness. Psychological/psychiatric: There is no clinical evidence of anxiety, depression, or melancholy.     Lab data:      Results for orders placed or performed during the hospital encounter of 03/20/19   CBC WITH 3 PART DIFF     Status: Abnormal   Result Value Ref Range Status    WBC 4.0 (L) 4.5 - 13.0 K/uL Final    RBC 3.68 (L) 4.10 - 5.10 M/uL Final    HGB 11.3 (L) 12.0 - 16 g/dL Final    HCT 34.7 (L) 36 - 48 % Final    MCV 94.3 78 - 102 FL Final    MCH 30.7 25.0 - 35.0 PG Final    MCHC 32.6 31 - 37 g/dL Final    RDW 12.0 11.5 - 14.5 % Final    PLATELET 404 (L) 438 - 440 K/uL Final    NEUTROPHILS 58 40 - 70 % Final    MIXED CELLS 4 0.1 - 17 % Final    LYMPHOCYTES 38 14 - 44 % Final    ABS. NEUTROPHILS 2.3 1.8 - 9.5 K/UL Final    ABS. MIXED CELLS 0.2 0.0 - 2.3 K/uL Final    ABS. LYMPHOCYTES 1.5 1.1 - 5.9 K/UL Final     Comment: Test performed at [address] [place] [address] or Outpatient Infusion Center Location. Reviewed by Medical Director. DF AUTOMATED   Final           Assessment:     1. Myelodysplastic syndrome ([facility] [address]    2. Anemia, chronic renal failure, stage 3 (moderate) (HCC)    3. Thrombocytopenia ([facility] [address]    4. Chronic leukopenia          Plan:   Myelodysplastic syndrome/myeloproliferative disorder: I have explained to the patient that she has this chronic bone marrow disorder, which is contributing to her anemia, as well as her chronic kidney disease. The leukopenia is relatively stable at this time with a WBC count of 4.0 and the platelet count is currently 138,000. This will continue to be monitored every 3 months for any evidence of progression. 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 I have explained to patient that her CBC today shows a WBC of 4.0, hemoglobin is 11.3 g/dL and hematocrit is 34.7 %. therapeutic intervention will be indicated when her hemoglobin declines below 10 g/dl and hematocrit is below 30%. She will receive 60,000 units of Procrit subcutaneous injection once every 2 weeks or once a month. At this time, she will continue  taking ferrous sulfate once a day.   I will see her for follow up in clinic in 3 months. Chronic leukopenia: I have explained to patient that her WBC is stable today at 4.0. I will monitor this however if her WBC declines to 1.0 and absolute neutrophil also declines to 0.5,  she will need intervention in the form of Neupogen or Neulasta. No intervention is warranted at this time. Thrombocytopenia : I have explained to patient that her Platelet today is normal at 138,000. She denies bleeding or bruising. Previous bone marrow biopsy did not show any evidence of lymphropliferative disorder. I will continue to monitor the Platelet counts on a regular basis. Therapeutic intervention would be recommended if the platelet count should decline below 30,000. Follow up in 3 months. Orders Placed This Encounter    COMPLETE CBC & AUTO DIFF WBC    InHouse CBC ([facility])     Standing Status:   Future     Number of Occurrences:   1     Standing Expiration Date:   3/27/2019    IRON PROFILE     Standing Status:   Future     Number of Occurrences:   1     Standing Expiration Date:   3/20/2020    FERRITIN     Standing Status:   Future     Number of Occurrences:   1     Standing Expiration Date:   3/25/6804    METABOLIC PANEL, COMPREHENSIVE     Standing Status:   Future     Number of Occurrences:   1     Standing Expiration Date:   3/20/2020       Gene Powers MD  3/20/2019    I have assessed the patient independently and  agree with the full assessment as outlined.   Catrina Humphrey MD, Je Schmidt

## 2019-03-20 NOTE — PATIENT INSTRUCTIONS
Myelodysplastic Syndromes: Care Instructions  Your Care Instructions  Myelodysplastic syndromes, also called MDS, are a group of rare conditions in which the bone marrow does not make enough healthy blood cells. Normally, the bone marrow makes red blood cells, white blood cells, and platelets. These cells carry oxygen in the blood, help the body fight infections, and help the blood clot. With MDS, you may feel weak and tired, get infections often, and bruise easily, although symptoms tend to vary. MDS is a form of blood cancer. In some cases, MDS can turn into acute myeloid leukemia (AML), another type of cancer. Some people develop MDS after treatment for cancer or exposure to pesticides or other chemicals. But in most cases, the cause of MDS is not known. Your doctor will use the results of blood tests to guide your treatment. There are many types of MDS, with different treatment plans for each. If you have enough red blood cells and are feeling all right, you may not need active treatment, but you and your doctor will want to watch your condition carefully. If you start feeling lightheaded and have no energy, you may need a blood transfusion. Your doctor also may give you antibiotics to prevent or treat infection. Follow-up care is a key part of your treatment and safety. Be sure to make and go to all appointments, and call your doctor if you are having problems. It's also a good idea to know your test results and keep a list of the medicines you take. How can you care for yourself at home? · Take your medicines exactly as prescribed. Call your doctor if you think you are having a problem with your medicine. You will get more details on the specific medicines your doctor prescribes. · If your doctor prescribed antibiotics, take them as directed. Do not stop taking them just because you feel better. You need to take the full course of antibiotics.  If you have side effects from antibiotics, tell your doctor. · Take steps to control your stress and workload. Learn relaxation techniques. ? Share your feelings. Stress and tension affect our emotions. By expressing your feelings to others, you may be able to understand and cope with them. ? Consider joining a support group. Talking about a problem with your spouse, a good friend, or other people with similar problems is a good way to reduce tension and stress. ? Express yourself with art. Try writing, crafts, dance, or art to relieve stress. ? Be kind to your body and mind. Getting enough sleep, eating a healthy diet, and taking time to do things you enjoy can contribute to an overall feeling of balance in your life and help reduce stress. ? Get help if you need it. Discuss your concerns with your doctor or counselor. · Do not smoke. Smoking can make blood problems worse. If you need help quitting, talk to your doctor about stop-smoking programs and medicines. These can increase your chances of quitting for good. · If you have not already done so, prepare a list of advance directives. Advance directives are instructions to your doctor and family members about what kind of care you want if you become unable to speak or express yourself. · Call the [employer] (2-557.565.5811) or visit its website at [address] for more information. When should you call for help? Call 911 anytime you think you may need emergency care.  For example, call if:    · You passed out (lost consciousness).    Call your doctor now or seek immediate medical care if:    · You have a fever.     · You have abnormal bleeding.     · You have new or worse pain.     · You think you have an infection.     · You have new symptoms, such as a cough, belly pain, vomiting, diarrhea, or a rash.    Watch closely for changes in your health, and be sure to contact your doctor if:    · You are much more tired than usual.     · You have swollen glands in your armpits, groin, or neck.     · You do not get better as expected. Where can you learn more? Go to http://nichol-john.info/. Enter Jorge A Roberts in the search box to learn more about \"Myelodysplastic Syndromes: Care Instructions. \"  Current as of: March 27, 2018  Content Version: 11.9  © 6318-2721 [employer] Care instructions adapted under license by [employer] (which disclaims liability or warranty for this information). If you have questions about a medical condition or this instruction, always ask your healthcare professional. [address] any warranty or liability for your use of this information. Anemia: Care Instructions  Your Care Instructions    Anemia is a low level of red blood cells, which carry oxygen throughout your body. Many things can cause anemia. Lack of iron is one of the most common causes. Your body needs iron to make hemoglobin, a substance in red blood cells that carries oxygen from the lungs to your body's cells. Without enough iron, the body produces fewer and smaller red blood cells. As a result, your body's cells do not get enough oxygen, and you feel tired and weak. And you may have trouble concentrating. Bleeding is the most common cause of a lack of iron. You may have heavy menstrual bleeding or bleeding caused by conditions such as ulcers, hemorrhoids, or cancer. Regular use of aspirin or other anti-inflammatory medicines (such as ibuprofen) also can cause bleeding in some people. A lack of iron in your diet also can cause anemia, especially at times when the body needs more iron, such as during pregnancy, infancy, and the teen years. Your doctor may have prescribed iron pills. It may take several months of treatment for your iron levels to return to normal. Your doctor also may suggest that you eat foods that are rich in iron, such as meat and beans. There are many other causes of anemia. It is not always due to a lack of iron.  Finding the specific cause of your anemia will help your doctor find the right treatment for you. Follow-up care is a key part of your treatment and safety. Be sure to make and go to all appointments, and call your doctor if you are having problems. It's also a good idea to know your test results and keep a list of the medicines you take. How can you care for yourself at home? · Take your medicines exactly as prescribed. Call your doctor if you think you are having a problem with your medicine. · If your doctor recommends iron pills, take them as directed:  ? Try to take the pills on an empty stomach about 1 hour before or 2 hours after meals. But you may need to take iron with food to avoid an upset stomach. ? Do not take antacids or drink milk or caffeine drinks (such as coffee, tea, or cola) at the same time or within 2 hours of the time that you take your iron. They can make it hard for your body to absorb the iron. ? Vitamin C (from food or supplements) helps your body absorb iron. Try taking iron pills with a glass of orange juice or some other food that is high in vitamin C, such as citrus fruits. ? Iron pills may cause stomach problems, such as heartburn, nausea, diarrhea, constipation, and cramps. Be sure to drink plenty of fluids, and include fruits, vegetables, and fiber in your diet each day. Iron pills often make your bowel movements dark or green. ? If you forget to take an iron pill, do not take a double dose of iron the next time you take a pill. ? Keep iron pills out of the reach of small children. An overdose of iron can be very dangerous. · Follow your doctor's advice about eating iron-rich foods. These include red meat, shellfish, poultry, eggs, beans, raisins, whole-grain bread, and leafy green vegetables. · Steam vegetables to help them keep their iron content. When should you call for help? Call 911 anytime you think you may need emergency care.  For example, call if:    · You have symptoms of a heart attack. These may include:  ? Chest pain or pressure, or a strange feeling in the chest.  ? Sweating. ? Shortness of breath. ? Nausea or vomiting. ? Pain, pressure, or a strange feeling in the back, neck, jaw, or upper belly or in one or both shoulders or arms. ? Lightheadedness or sudden weakness. ? A fast or irregular heartbeat. After you call 911, the  may tell you to chew 1 adult-strength or 2 to 4 low-dose aspirin. Wait for an ambulance. Do not try to drive yourself.     · You passed out (lost consciousness).    Call your doctor now or seek immediate medical care if:    · You have new or increased shortness of breath.     · You are dizzy or lightheaded, or you feel like you may faint.     · Your fatigue and weakness continue or get worse.     · You have any abnormal bleeding, such as:  ? Nosebleeds. ? Vaginal bleeding that is different (heavier, more frequent, at a different time of the month) than what you are used to.  ? Bloody or black stools, or rectal bleeding. ? Bloody or pink urine.    Watch closely for changes in your health, and be sure to contact your doctor if:    · You do not get better as expected. Where can you learn more? Go to http://nichol-john.info/. Enter R301 in the search box to learn more about \"Anemia: Care Instructions. \"  Current as of: May 6, 2018  Content Version: 11.9  © 5597-4551 [employer]. Care instructions adapted under license by [employer] (which disclaims liability or warranty for this information). If you have questions about a medical condition or this instruction, always ask your healthcare professional. [place] [address] any warranty or liability for your use of this information.

## 2019-07-24 NOTE — PROGRESS NOTES
Hematology/Oncology  Progress Note    Name: Janie Childs  Date: 2019  : 1940    PCP:ANTHONY Amaya MD     Ms. Monty Mcpherson is a 67 y.o.  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 Current therapy: Procrit,  dose of 60,000 units whenever the hematocrit is below 30% and supportive care. Subjective:     Ms. Monty Mcpherson is a 80-year-old woman who has a long-standing history of chronic kidney disease with associated anemia. She also has myelodysplastic syndrome. The patient has no complaint of weakness or fatigue. She also denies bleeding, blood loss or any bruising. She reports her appetite has improved greatly. She is using a cane for mobility support. Today, she has no additional complaints or concerns to report. she is in the office with her daughter. Past medical history, family history, and social history: these were reviewed and remains unchanged.     Past Medical History:   Diagnosis Date    Arthritis     Asthma     Breathing problem     Diabetes ([address]     Hypercholesterolemia     Hypertension     Iron deficiency anemia     Pacemaker     Ulcer      Past Surgical History:   Procedure Laterality Date    CARDIAC SURG PROCEDURE UNLIST      pacemaker    ENDOSCOPY, COLON, DIAGNOSTIC      HX HERNIA REPAIR      HX ORTHOPAEDIC      knee replacement     Social History     Socioeconomic History    Marital status:      Spouse name: Not on file    Number of children: Not on file    Years of education: Not on file    Highest education level: Not on file   Occupational History    Not on file   Social Needs    Financial resource strain: Not on file    Food insecurity:     Worry: Not on file     Inability: Not on file    Transportation needs:     Medical: Not on file     Non-medical: Not on file   Tobacco Use    Smoking status: Never Smoker    Smokeless tobacco: Never Used   Substance and Sexual Activity    Alcohol use: No    Drug use: No    Sexual activity: Not on file   Lifestyle    Physical activity:     Days per week: Not on file     Minutes per session: Not on file    Stress: Not on file   Relationships    Social connections:     Talks on phone: Not on file     Gets together: Not on file     Attends Ca[employer] service: Not on file     Active member of club or organization: Not on file     Attends meetings of clubs or organizations: Not on file     Relationship status: Not on file    Intimate partner violence:     Fear of current or ex partner: Not on file     Emotionally abused: Not on file     Physically abused: Not on file     Forced sexual activity: Not on file   Other Topics Concern    Not on file   Social History Narrative    Not on file     Family History   Problem Relation Age of Onset    Hypertension Other     Heart Disease Neg Hx     Cancer Neg Hx     Diabetes Neg Hx     Stroke Neg Hx      Current Outpatient Medications   Medication Sig Dispense Refill    simvastatin (ZOCOR) 40 mg tablet Take  by mouth nightly.  apixaban (ELIQUIS) 2.5 mg tablet Take 2.5 mg by mouth two (2) times a day.  lisinopril (PRINIVIL, ZESTRIL) 40 mg tablet Take 40 mg by mouth daily.  NIFEdipine ER (PROCARDIA XL) 90 mg ER tablet Take 90 mg by mouth daily.  hydrochlorothiazide (HYDRODIURIL) 25 mg tablet Take 25 mg by mouth daily. Review of Systems  Constitutional: The patient has no acute distress or discomfort. HEENT: The patient denies recent head trauma, eye pain, blurred vision,  hearing deficit, oropharyngeal mucosal pain or lesions, and the patient denies throat pain or discomfort. Lymphatics: The patient denies palpable peripheral lymphadenopathy. Hematologic: The patient denies having bruising, bleeding, or progressive fatigue. Respiratory: Patient denies having shortness of breath, cough, sputum production, fever, or dyspnea on exertion.   Cardiovascular: The patient denies having leg pain, leg swelling, heart palpitations, chest permit, chest pain, or lightheadedness. The patient denies having dyspnea on exertion. Gastrointestinal: The patient denies having nausea, emesis, or diarrhea. The patient denies having any hematemesis or blood in the stool. Genitourinary: Patient denies having urinary urgency, frequency, or dysuria. The patient denies having blood in the urine. Psychological: The patient denies having symptoms of nervousness, anxiety, depression, or thoughts of harming himself some of this. Skin: Patient denies having skin rashes, skin, ulcerations, or unexplained itching or pruritus. Musculoskeletal: The patient denies having pain in the joints or bones. S/p right knee replacement. .using a cane for mobility support. Objective:     Visit Vitals  BP (!) 162/91   Pulse 79   Temp 97.3 °F (36.3 °C) (Oral)   Resp 18   Ht 5' 4\" (1.626 m)   Wt 60.8 kg (134 lb)   SpO2 98%   BMI 23.00 kg/m²     ECOG 1 PS 0/10  Physical Exam:   Gen. Appearance: The patient is in no acute distress. Skin: There is no bruise or rash. HEENT: The exam is unremarkable. Neck: Supple without lymphadenopathy or thyromegaly. Lungs: Clear to auscultation and percussion; there are no wheezes or rhonchi. Heart: Regular rate and rhythm; there are no murmurs, gallops, or rubs. Abdomen: Bowel sounds are present and normal.  There is no guarding, tenderness, or hepatosplenomegaly. Extremities: There is no clubbing, cyanosis, or edema. Neurologic: There are no focal neurologic deficits. Lymphatics: There is no palpable peripheral lymphadenopathy. Musculoskeletal: The patient has full range of motion at all joints. There is no evidence of joint deformity or effusions. There is no focal joint tenderness. Psychological/psychiatric: There is no clinical evidence of anxiety, depression, or melancholy.     Lab data:      Results for orders placed or performed during the hospital encounter of 07/24/19   CBC WITH 3 PART DIFF     Status: Abnormal   Result Value Ref Range Status    WBC 4.6 4.5 - 13.0 K/uL Final    RBC 3.58 (L) 4.10 - 5.10 M/uL Final    HGB 11.1 (L) 12.0 - 16 g/dL Final    HCT 33.0 (L) 36 - 48 % Final    MCV 92.2 78 - 102 FL Final    MCH 31.0 25.0 - 35.0 PG Final    MCHC 33.6 31 - 37 g/dL Final    RDW 12.4 11.5 - 14.5 % Final    PLATELET 815 (L) 806 - 440 K/uL Final    NEUTROPHILS 66 40 - 70 % Final    MIXED CELLS 6 0.1 - 17 % Final    LYMPHOCYTES 29 14 - 44 % Final    ABS. NEUTROPHILS 3.0 1.8 - 9.5 K/UL Final    ABS. MIXED CELLS 0.3 0.0 - 2.3 K/uL Final    ABS. LYMPHOCYTES 1.3 1.1 - 5.9 K/UL Final     Comment: Test performed at [address] [place] [address] or Outpatient Infusion Center Location. Reviewed by Medical Director. DF AUTOMATED   Final           Assessment:     1. Myelodysplastic syndrome ([facility] [address]    2. Anemia, chronic renal failure, stage 3 (moderate) (HCC)    3. Thrombocytopenia ([address]    4. Chronic leukopenia    5. Poor appetite          Plan:   Myelodysplastic syndrome/myeloproliferative disorder: I have explained to the patient that she has this chronic bone marrow disorder, which is contributing to her anemia, as well as her chronic kidney disease. The leukopenia is relatively stable at this time with a WBC count of 4.6 and the platelet count is currently 125,000. This will continue to be monitored every 3 months for any evidence of progression. 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 I have explained to patient that her CBC today shows a WBC of 4.6, hemoglobin is 11.1 g/dL, and hematocrit is 33 %. therapeutic intervention will be indicated when her hemoglobin declines below 10 g/dl and hematocrit is below 30%. She will receive 60,000 units of Procrit subcutaneous injection once every 2 weeks or once a month. At this time, she will continue  taking ferrous sulfate once a day. I will see her for follow up in clinic in 3 months. Chronic leukopenia: I have explained to patient that her WBC is stable today at 4.6. I will monitor this however if her WBC declines to 1.0 and absolute neutrophil also declines to 0.5,  she will need intervention in the form of Neupogen or Neulasta. No intervention is warranted at this time. Thrombocytopenia : I have explained to patient that her Platelet today is normal at 125,000. She denies bleeding or bruising. Previous bone marrow biopsy did not show any evidence of lymphropliferative disorder. I will continue to monitor the Platelet counts on a regular basis. Therapeutic intervention would be recommended if the platelet count should decline below 30,000. Follow up in 3 months.      Orders Placed This Encounter    COMPLETE CBC & AUTO DIFF WBC    InHouse CBC ([facility])     Standing Status:   Future     Number of Occurrences:   1     Standing Expiration Date:   4/15/7176    METABOLIC PANEL, COMPREHENSIVE     Standing Status:   Future     Standing Expiration Date:   7/24/2020    IRON PROFILE     Standing Status:   Future     Standing Expiration Date:   7/24/2020    FERRITIN     Standing Status:   Future     Standing Expiration Date:   7/24/2020       Maite Willis MD  7/24/2019

## 2019-11-05 ENCOUNTER — TELEPHONE (OUTPATIENT)
Dept: ONCOLOGY | Age: 79
End: 2019-11-05

## 2019-11-05 NOTE — TELEPHONE ENCOUNTER
[facility] returning call can be reached at [phone]. Patient passed away 10/26/2019. Patient was scheduled for 2019. Message had been left needing to r/s appt Dr not in clinic. Called back spoke with Ms. Maria Dolores Brambila. Cancelled appt for 2019 and marked chart as .
